# Patient Record
Sex: MALE | Race: WHITE | Employment: FULL TIME | URBAN - METROPOLITAN AREA
[De-identification: names, ages, dates, MRNs, and addresses within clinical notes are randomized per-mention and may not be internally consistent; named-entity substitution may affect disease eponyms.]

---

## 2019-12-03 ENCOUNTER — OFFICE VISIT (OUTPATIENT)
Dept: FAMILY MEDICINE | Facility: CLINIC | Age: 59
End: 2019-12-03
Payer: COMMERCIAL

## 2019-12-03 VITALS
OXYGEN SATURATION: 98 % | TEMPERATURE: 98.3 F | DIASTOLIC BLOOD PRESSURE: 88 MMHG | HEIGHT: 71 IN | RESPIRATION RATE: 16 BRPM | WEIGHT: 211 LBS | SYSTOLIC BLOOD PRESSURE: 128 MMHG | BODY MASS INDEX: 29.54 KG/M2 | HEART RATE: 79 BPM

## 2019-12-03 DIAGNOSIS — Z80.0 FAMILY HX OF COLON CANCER: ICD-10-CM

## 2019-12-03 DIAGNOSIS — Z82.49 FAMILY HISTORY OF PREMATURE CORONARY ARTERY DISEASE: ICD-10-CM

## 2019-12-03 DIAGNOSIS — Z00.00 PHYSICAL EXAM, ANNUAL: Primary | ICD-10-CM

## 2019-12-03 DIAGNOSIS — Z12.11 COLON CANCER SCREENING: ICD-10-CM

## 2019-12-03 DIAGNOSIS — E78.00 PURE HYPERCHOLESTEROLEMIA: ICD-10-CM

## 2019-12-03 PROBLEM — R03.0 ELEVATED BLOOD PRESSURE READING WITHOUT DIAGNOSIS OF HYPERTENSION: Status: ACTIVE | Noted: 2019-12-03

## 2019-12-03 PROCEDURE — 200200 PR NO CHARGE: Performed by: FAMILY MEDICINE

## 2019-12-03 PROCEDURE — 99386 PREV VISIT NEW AGE 40-64: CPT | Performed by: FAMILY MEDICINE

## 2019-12-03 ASSESSMENT — PAIN SCALES - GENERAL: PAINLEVEL: 0-NO PAIN

## 2019-12-10 ASSESSMENT — ENCOUNTER SYMPTOMS
ENDOCRINE NEGATIVE: 1
CONSTITUTIONAL NEGATIVE: 1
HEMATOLOGIC/LYMPHATIC NEGATIVE: 1
PSYCHIATRIC NEGATIVE: 1
EYES NEGATIVE: 1
GASTROINTESTINAL NEGATIVE: 1
CARDIOVASCULAR NEGATIVE: 1
BACK PAIN: 1
NEUROLOGICAL NEGATIVE: 1
RESPIRATORY NEGATIVE: 1

## 2019-12-10 NOTE — PROGRESS NOTES
Subjective      Patient ID: Jaskaran Simpson is a 59 y.o. male.    Rolo is being seen by me for the first time in over 20 years and he is pleased to report that his health generally has been good.  His past medical history is significant for GI issues and that he has a family history of colon cancer (father) and he himself has had 6 colonoscopies with multiple polyps.  Unfortunately his last colonoscopy was .  He does have concerns today about possible hernia.  He continues to see a chiropractor regularly for mid back pain and gets good relief from those treatments.  His blood pressure is always been high at doctor's appointments but his home blood pressure monitor is always normal.  Both of his parents  from acute myocardial infarction's in their early 70s.      The following have been reviewed and updated as appropriate in this visit:       Review of Systems   Constitutional: Negative.    HENT: Negative.    Eyes: Negative.    Respiratory: Negative.    Cardiovascular: Negative.    Gastrointestinal: Negative.    Endocrine: Negative.    Genitourinary: Negative.    Musculoskeletal: Positive for back pain.   Skin: Negative.    Allergic/Immunologic: Negative for food allergies.   Neurological: Negative.    Hematological: Negative.    Psychiatric/Behavioral: Negative.      Past Medical History:   Diagnosis Date   • Hypertension      Family History   Problem Relation Age of Onset   • Hypertension Biological Mother    • Hyperlipidemia Biological Mother    • Colon cancer Biological Father      Social History     Socioeconomic History   • Marital status:      Spouse name: Not on file   • Number of children: Not on file   • Years of education: Not on file   • Highest education level: Not on file   Occupational History   • Not on file   Social Needs   • Financial resource strain: Not on file   • Food insecurity:     Worry: Not on file     Inability: Not on file   • Transportation needs:     Medical: Not on file  "    Non-medical: Not on file   Tobacco Use   • Smoking status: Current Every Day Smoker   • Smokeless tobacco: Never Used   Substance and Sexual Activity   • Alcohol use: Yes   • Drug use: Not Currently   • Sexual activity: Defer   Lifestyle   • Physical activity:     Days per week: Not on file     Minutes per session: Not on file   • Stress: Not on file   Relationships   • Social connections:     Talks on phone: Not on file     Gets together: Not on file     Attends [employer]ism service: Not on file     Active member of club or organization: Not on file     Attends meetings of clubs or organizations: Not on file     Relationship status: Not on file   • Intimate partner violence:     Fear of current or ex partner: Not on file     Emotionally abused: Not on file     Physically abused: Not on file     Forced sexual activity: Not on file   Other Topics Concern   • Not on file   Social History Narrative   • Not on file     No Known Allergies        No current outpatient medications on file.     No current facility-administered medications for this visit.      Objective     Visit Vitals  /88 (BP Location: Right upper arm, Patient Position: Sitting)   Pulse 79   Temp 36.8 °C (98.3 °F) (Oral)   Resp 16   Ht 1.803 m (5' 11\")   Wt 95.7 kg (211 lb)   SpO2 98%   BMI 29.43 kg/m²       Physical Exam   Constitutional: He is oriented to person, place, and time. He appears well-developed and well-nourished.   HENT:   Head: Normocephalic.   Eyes: Conjunctivae are normal.   Neck: Neck supple. No thyromegaly present.   Cardiovascular: Normal rate, regular rhythm, normal heart sounds and intact distal pulses.   No murmur heard.  Pulmonary/Chest: Effort normal and breath sounds normal.   Abdominal: Soft. Bowel sounds are normal. He exhibits no mass. A hernia is present. Hernia confirmed positive in the left inguinal area.   Musculoskeletal: He exhibits no edema or deformity.   Lymphadenopathy:     He has no cervical adenopathy. "   Neurological: He is alert and oriented to person, place, and time.   Skin: Skin is warm and dry. No rash noted.   Psychiatric: He has a normal mood and affect. His behavior is normal. Judgment and thought content normal.       Assessment/Plan   Problem List Items Addressed This Visit        Endocrine/Metabolic    Hyperlipidemia    Relevant Orders    CT HEART CORONARY CALCIUM SCORE    ECG 12 LEAD OFFICE PERFORMED       Other    Family hx of colon cancer     Lab pending.           Other Visit Diagnoses     Physical exam, annual    -  Primary    Recheck annually.  Full lab pending.    Relevant Orders    ECG 12 LEAD OFFICE PERFORMED    CBC    Comprehensive metabolic panel    Hepatitis C antibody    Lipid panel    Urinalysis with microscopic    PSA    Colon cancer screening        Referred back to [facility]    Relevant Orders    Direct Access Colonoscopy [facility]    Family history of premature coronary artery disease        Lab pending.  Recommend CT coronary calcium score.  Patient agrees to schedule.    Relevant Orders    CT HEART CORONARY CALCIUM SCORE    ECG 12 LEAD OFFICE PERFORMED        Héctor Dyer,   12/10/2019

## 2019-12-11 LAB
ALBUMIN SERPL-MCNC: 4.6 G/DL (ref 3.6–5.1)
ALBUMIN/GLOB SERPL: 1.9 (CALC) (ref 1–2.5)
ALP SERPL-CCNC: 49 U/L (ref 40–115)
ALT SERPL-CCNC: 61 U/L (ref 9–46)
APPEARANCE UR: CLEAR
AST SERPL-CCNC: 29 U/L (ref 10–35)
BACTERIA #/AREA URNS HPF: ABNORMAL /HPF
BILIRUB SERPL-MCNC: 0.9 MG/DL (ref 0.2–1.2)
BILIRUB UR QL STRIP: NEGATIVE
BUN SERPL-MCNC: 15 MG/DL (ref 7–25)
BUN/CREAT SERPL: ABNORMAL (CALC) (ref 6–22)
CALCIUM SERPL-MCNC: 9.3 MG/DL (ref 8.6–10.3)
CHLORIDE SERPL-SCNC: 101 MMOL/L (ref 98–110)
CHOLEST SERPL-MCNC: 230 MG/DL
CHOLEST/HDLC SERPL: 4.4 (CALC)
CO2 SERPL-SCNC: 26 MMOL/L (ref 20–32)
COLOR UR: YELLOW
CREAT SERPL-MCNC: 1.13 MG/DL (ref 0.7–1.33)
ERYTHROCYTE [DISTWIDTH] IN BLOOD BY AUTOMATED COUNT: 12 % (ref 11–15)
GLOBULIN SER CALC-MCNC: 2.4 G/DL (CALC) (ref 1.9–3.7)
GLUCOSE SERPL-MCNC: 97 MG/DL (ref 65–99)
GLUCOSE UR QL STRIP: NEGATIVE
HCT VFR BLD AUTO: 43.1 % (ref 38.5–50)
HCV AB S/CO SERPL IA: 0.02
HCV AB SERPL QL IA: NORMAL
HDLC SERPL-MCNC: 52 MG/DL
HGB BLD-MCNC: 14.8 G/DL (ref 13.2–17.1)
HGB UR QL STRIP: NEGATIVE
HYALINE CASTS #/AREA URNS LPF: ABNORMAL /LPF
KETONES UR QL STRIP: ABNORMAL
LDLC SERPL CALC-MCNC: 163 MG/DL (CALC)
LEUKOCYTE ESTERASE UR QL STRIP: NEGATIVE
MCH RBC QN AUTO: 33.3 PG (ref 27–33)
MCHC RBC AUTO-ENTMCNC: 34.3 G/DL (ref 32–36)
MCV RBC AUTO: 97.1 FL (ref 80–100)
NITRITE UR QL STRIP: NEGATIVE
NONHDLC SERPL-MCNC: 178 MG/DL (CALC)
PH UR STRIP: 6 [PH] (ref 5–8)
PLATELET # BLD AUTO: 206 THOUSAND/UL (ref 140–400)
PMV BLD REES-ECKER: 9.8 FL (ref 7.5–12.5)
POTASSIUM SERPL-SCNC: 4.3 MMOL/L (ref 3.5–5.3)
PROT SERPL-MCNC: 7 G/DL (ref 6.1–8.1)
PROT UR QL STRIP: NEGATIVE
PSA SERPL-MCNC: 1.3 NG/ML
QUEST EGFR NON-AFR. AMERICAN: 71 ML/MIN/1.73M2
RBC # BLD AUTO: 4.44 MILLION/UL (ref 4.2–5.8)
RBC #/AREA URNS HPF: ABNORMAL /HPF
SODIUM SERPL-SCNC: 136 MMOL/L (ref 135–146)
SP GR UR STRIP: 1.02 (ref 1–1.03)
SQUAMOUS #/AREA URNS HPF: ABNORMAL /HPF
TRIGL SERPL-MCNC: 59 MG/DL
WBC # BLD AUTO: 6.2 THOUSAND/UL (ref 3.8–10.8)
WBC #/AREA URNS HPF: ABNORMAL /HPF

## 2020-01-08 ENCOUNTER — TELEPHONE (OUTPATIENT)
Dept: FAMILY MEDICINE | Facility: CLINIC | Age: 60
End: 2020-01-08

## 2020-01-30 ENCOUNTER — TELEPHONE (OUTPATIENT)
Dept: FAMILY MEDICINE | Facility: CLINIC | Age: 60
End: 2020-01-30

## 2020-01-30 NOTE — TELEPHONE ENCOUNTER
Phone call from pt about bill for $30. Charge was for a EKG but pt didn't have a EKG don. I advise pt that the EKG was retracted and he does not owe anything. I also gave pt my direct phone number incase he has any other problems.

## 2020-02-25 ENCOUNTER — OFFICE VISIT (OUTPATIENT)
Dept: FAMILY MEDICINE | Facility: CLINIC | Age: 60
End: 2020-02-25
Payer: COMMERCIAL

## 2020-02-25 VITALS
BODY MASS INDEX: 28.56 KG/M2 | OXYGEN SATURATION: 98 % | WEIGHT: 204 LBS | DIASTOLIC BLOOD PRESSURE: 82 MMHG | HEART RATE: 84 BPM | SYSTOLIC BLOOD PRESSURE: 124 MMHG | HEIGHT: 71 IN | TEMPERATURE: 97.9 F | RESPIRATION RATE: 16 BRPM

## 2020-02-25 DIAGNOSIS — E78.00 PURE HYPERCHOLESTEROLEMIA: Primary | ICD-10-CM

## 2020-02-25 PROCEDURE — 99214 OFFICE O/P EST MOD 30 MIN: CPT | Performed by: FAMILY MEDICINE

## 2020-02-25 ASSESSMENT — ENCOUNTER SYMPTOMS
LIGHT-HEADEDNESS: 0
DIZZINESS: 0
PSYCHIATRIC NEGATIVE: 1
HEADACHES: 0
DIARRHEA: 0
COUGH: 0
SORE THROAT: 0
CONSTITUTIONAL NEGATIVE: 1
SINUS PRESSURE: 0
CONSTIPATION: 0
PALPITATIONS: 0
SHORTNESS OF BREATH: 0

## 2020-02-25 ASSESSMENT — PAIN SCALES - GENERAL: PAINLEVEL: 0-NO PAIN

## 2020-02-25 NOTE — ASSESSMENT & PLAN NOTE
The purpose and nature of cholesterol was explained as well as a lengthy review of dietary sources.  We also again discussed the benefits of CT coronary calcium score in an effort to assess his risk.  He has agreed to pursue this and final decision regarding medication will be pending those results.  Total visit time was 30 minutes with greater than 50% spent discussing the current condition and treatment options/plan.

## 2020-02-25 NOTE — PROGRESS NOTES
Subjective      Patient ID: Jsakaran Simpson is a 59 y.o. male.    He is here today to discuss the results of his recent lab which demonstrated a significantly elevated cholesterol.  He admits that he eats a significant amount of animal-based foods and cheese.      The following have been reviewed and updated as appropriate in this visit:       Review of Systems   Constitutional: Negative.    HENT: Negative for congestion, sinus pressure and sore throat.    Respiratory: Negative for cough and shortness of breath.    Cardiovascular: Negative for chest pain and palpitations.   Gastrointestinal: Negative for constipation and diarrhea.   Genitourinary: Negative.    Skin: Negative for rash.   Neurological: Negative for dizziness, light-headedness and headaches.   Psychiatric/Behavioral: Negative.      Past Medical History:   Diagnosis Date   • Hypertension      Family History   Problem Relation Age of Onset   • Hypertension Biological Mother    • Hyperlipidemia Biological Mother    • Colon cancer Biological Father      Social History     Socioeconomic History   • Marital status:      Spouse name: Not on file   • Number of children: Not on file   • Years of education: Not on file   • Highest education level: Not on file   Occupational History   • Not on file   Social Needs   • Financial resource strain: Not on file   • Food insecurity:     Worry: Not on file     Inability: Not on file   • Transportation needs:     Medical: Not on file     Non-medical: Not on file   Tobacco Use   • Smoking status: Current Every Day Smoker   • Smokeless tobacco: Never Used   Substance and Sexual Activity   • Alcohol use: Yes   • Drug use: Not Currently   • Sexual activity: Defer   Lifestyle   • Physical activity:     Days per week: Not on file     Minutes per session: Not on file   • Stress: Not on file   Relationships   • Social connections:     Talks on phone: Not on file     Gets together: Not on file     Attends Christian service:  "Not on file     Active member of club or organization: Not on file     Attends meetings of clubs or organizations: Not on file     Relationship status: Not on file   • Intimate partner violence:     Fear of current or ex partner: Not on file     Emotionally abused: Not on file     Physically abused: Not on file     Forced sexual activity: Not on file   Other Topics Concern   • Not on file   Social History Narrative   • Not on file     No Known Allergies        No current outpatient medications on file.     No current facility-administered medications for this visit.      Objective     Visit Vitals  /82 (BP Location: Right upper arm, Patient Position: Sitting)   Pulse 84   Temp 36.6 °C (97.9 °F) (Temporal)   Resp 16   Ht 1.803 m (5' 11\")   Wt 92.5 kg (204 lb)   SpO2 98%   BMI 28.45 kg/m²       Physical Exam   Constitutional: He is oriented to person, place, and time. He appears well-developed and well-nourished.   HENT:   Head: Normocephalic.   Eyes: Conjunctivae are normal.   Cardiovascular: Normal rate and regular rhythm.   Pulmonary/Chest: Effort normal.   Neurological: He is alert and oriented to person, place, and time.   Skin: Skin is warm and dry.   Psychiatric: He has a normal mood and affect. His behavior is normal. Judgment and thought content normal.       Assessment/Plan   Problem List Items Addressed This Visit        Endocrine/Metabolic    Pure hypercholesterolemia - Primary     The purpose and nature of cholesterol was explained as well as a lengthy review of dietary sources.  We also again discussed the benefits of CT coronary calcium score in an effort to assess his risk.  He has agreed to pursue this and final decision regarding medication will be pending those results.  Total visit time was 30 minutes with greater than 50% spent discussing the current condition and treatment options/plan.             Héctor Dyer, DO  2/25/2020  "

## 2020-02-25 NOTE — PATIENT INSTRUCTIONS
Patient Education     Steps to Quit Smoking    Smoking tobacco can be harmful to your health and can affect almost every organ in your body. Smoking puts you, and those around you, at risk for developing many serious chronic diseases. Quitting smoking is difficult, but it is one of the best things that you can do for your health. It is never too late to quit.  What are the benefits of quitting smoking?  When you quit smoking, you lower your risk of developing serious diseases and conditions, such as:  · Lung cancer or lung disease, such as COPD.  · Heart disease.  · Stroke.  · Heart attack.  · Infertility.  · Osteoporosis and bone fractures.  Additionally, symptoms such as coughing, wheezing, and shortness of breath may get better when you quit. You may also find that you get sick less often because your body is stronger at fighting off colds and infections. If you are pregnant, quitting smoking can help to reduce your chances of having a baby of low birth weight.  How do I get ready to quit?  When you decide to quit smoking, create a plan to make sure that you are successful. Before you quit:  · Pick a date to quit. Set a date within the next two weeks to give you time to prepare.  · Write down the reasons why you are quitting. Keep this list in places where you will see it often, such as on your bathroom mirror or in your car or wallet.  · Identify the people, places, things, and activities that make you want to smoke (triggers) and avoid them. Make sure to take these actions:  ? Throw away all cigarettes at home, at work, and in your car.  ? Throw away smoking accessories, such as ashtrays and lighters.  ? Clean your car and make sure to empty the ashtray.  ? Clean your home, including curtains and carpets.  · Tell your family, friends, and coworkers that you are quitting. Support from your loved ones can make quitting easier.  · Talk with your health care provider about your options for quitting  smoking.  · Find out what treatment options are covered by your health insurance.  What strategies can I use to quit smoking?  Talk with your healthcare provider about different strategies to quit smoking. Some strategies include:  · Quitting smoking altogether instead of gradually lessening how much you smoke over a period of time. Research shows that quitting “cold turkey” is more successful than gradually quitting.  · Attending in-person counseling to help you build problem-solving skills. You are more likely to have success in quitting if you attend several counseling sessions. Even short sessions of 10 minutes can be effective.  · Finding resources and support systems that can help you to quit smoking and remain smoke-free after you quit. These resources are most helpful when you use them often. They can include:  ? Online chats with a counselor.  ? Telephone quitlines.  ? Printed self-help materials.  ? Support groups or group counseling.  ? Text messaging programs.  ? Mobile phone applications.  · Taking medicines to help you quit smoking. (If you are pregnant or breastfeeding, talk with your health care provider first.) Some medicines contain nicotine and some do not. Both types of medicines help with cravings, but the medicines that include nicotine help to relieve withdrawal symptoms. Your health care provider may recommend:  ? Nicotine patches, gum, or lozenges.  ? Nicotine inhalers or sprays.  ? Non-nicotine medicine that is taken by mouth.  Talk with your health care provider about combining strategies, such as taking medicines while you are also receiving in-person counseling. Using these two strategies together makes you more likely to succeed in quitting than if you used either strategy on its own.  If you are pregnant or breastfeeding, talk with your health care provider about finding counseling or other support strategies to quit smoking. Do not take medicine to help you quit smoking unless told to  do so by your health care provider.  What things can I do to make it easier to quit?  Quitting smoking might feel overwhelming at first, but there is a lot that you can do to make it easier. Take these important actions:  · Reach out to your family and friends and ask that they support and encourage you during this time. Call telephone quitlines, reach out to support groups, or work with a counselor for support.  · Ask people who smoke to avoid smoking around you.  · Avoid places that trigger you to smoke, such as bars, parties, or smoke-break areas at work.  · Spend time around people who do not smoke.  · Lessen stress in your life, because stress can be a smoking trigger for some people. To lessen stress, try:  ? Exercising regularly.  ? Deep-breathing exercises.  ? Yoga.  ? Meditating.  ? Performing a body scan. This involves closing your eyes, scanning your body from head to toe, and noticing which parts of your body are particularly tense. Purposefully relax the muscles in those areas.  · Download or purchase mobile phone or tablet apps (applications) that can help you stick to your quit plan by providing reminders, tips, and encouragement. There are many free apps, such as QuitGuide from the CDC (Centers for Disease Control and Prevention). You can find other support for quitting smoking (smoking cessation) through smokefree.gov and other websites.  How will I feel when I quit smoking?  Within the first 24 hours of quitting smoking, you may start to feel some withdrawal symptoms. These symptoms are usually most noticeable 2-3 days after quitting, but they usually do not last beyond 2-3 weeks. Changes or symptoms that you might experience include:  · Mood swings.  · Restlessness, anxiety, or irritation.  · Difficulty concentrating.  · Dizziness.  · Strong cravings for sugary foods in addition to nicotine.  · Mild weight gain.  · Constipation.  · Nausea.  · Coughing or a sore throat.  · Changes in how your  medicines work in your body.  · A depressed mood.  · Difficulty sleeping (insomnia).  After the first 2-3 weeks of quitting, you may start to notice more positive results, such as:  · Improved sense of smell and taste.  · Decreased coughing and sore throat.  · Slower heart rate.  · Lower blood pressure.  · Clearer skin.  · The ability to breathe more easily.  · Fewer sick days.  Quitting smoking is very challenging for most people. Do not get discouraged if you are not successful the first time. Some people need to make many attempts to quit before they achieve long-term success. Do your best to stick to your quit plan, and talk with your health care provider if you have any questions or concerns.  This information is not intended to replace advice given to you by your health care provider. Make sure you discuss any questions you have with your health care provider.  Document Released: 12/12/2002 Document Revised: 07/24/2018 Document Reviewed: 05/03/2016  Elsevier Interactive Patient Education © 2019 [employer]

## 2020-10-12 ENCOUNTER — TELEMEDICINE (OUTPATIENT)
Dept: FAMILY MEDICINE | Facility: CLINIC | Age: 60
End: 2020-10-12
Payer: COMMERCIAL

## 2020-10-12 DIAGNOSIS — R50.9 FEVER AND CHILLS: ICD-10-CM

## 2020-10-12 DIAGNOSIS — U07.1 COVID-19: Primary | ICD-10-CM

## 2020-10-12 PROCEDURE — 99214 OFFICE O/P EST MOD 30 MIN: CPT | Mod: 95,CS | Performed by: NURSE PRACTITIONER

## 2020-10-12 ASSESSMENT — ENCOUNTER SYMPTOMS
BLOOD IN STOOL: 0
NECK PAIN: 0
ACTIVITY CHANGE: 0
DIARRHEA: 0
PALPITATIONS: 0
DIAPHORESIS: 1
NECK STIFFNESS: 0
FREQUENCY: 0
SINUS PRESSURE: 0
DIFFICULTY URINATING: 0
CHILLS: 1
SORE THROAT: 0
NAUSEA: 0
FEVER: 1
SHORTNESS OF BREATH: 0
TROUBLE SWALLOWING: 0
RHINORRHEA: 1
COUGH: 1
SINUS PAIN: 0
JOINT SWELLING: 0
FLANK PAIN: 0
BACK PAIN: 0
ABDOMINAL DISTENTION: 0
FATIGUE: 1
ANAL BLEEDING: 0
CHEST TIGHTNESS: 0
DYSURIA: 0
CONSTIPATION: 0
VOMITING: 0
ABDOMINAL PAIN: 0
HEMATURIA: 0

## 2020-10-12 NOTE — PROGRESS NOTES
Verification of Patient Location:  The patient affirms they are currently located in the following state: [place]    Request for Consent:    Video Encounter   Jing, my name is LISA Romeo.  Before we proceed, can you please verify your identification by telling me your full name and date of birth?  Can you tell me who is in the room with you?    You and I are about to have a telemedicine check-in or visit because you have requested it.  This is a live video-conference.  I am a real person, speaking to you in real time.  There is no one else with me on the video-conference.  However, when we use (Brilig, ulike, etc) it is important for you to know that the video-conference may not be secure or private.  I am not recording this conversation and I am asking you not to record it.  This telemedicine visit will be billed to your health insurance or you, if you are self-insured.  You understand you will be responsible for any copayments or coinsurances that apply to your telemedicine visit.  Communication platform used for this encounter:  DoximFridge     Before starting our telemedicine visit, I am required to get your consent for this virtual check-in or visit by telemedicine. Do you consent?      Patient Response to Request for Consent:  Yes      Visit Documentation:  Subjective     Patient ID: Jaskaran Simpson is a 60 y.o. male.  1960      HPI  Seen at urgent care and reports covid + on October 5th to [facility] [place].      Reports symptoms started October 4th and tested on 6th   Still running a temp since last Sunday now day 8 - alt  taking Tylenol 1 gram twice per day and Ibuprofen 400 mg twice per day   Yesterday 102 spike shonna at night sweats then returns to normal temp  Usually 100, spikes are usually around evening/bedtime    Denies feeling short of breath  Took zpak as well on the 5th but did not help  He can hold his breath for 20 seconds and able to do that     + post nasal drip, mild  cough v rare mostly in am  Diarrhea initially but resolved about 4 days ago  Stomach ok no pain  Good hydration urinating well     Overall feeling better than initial    Taking 15 min naps       The following have been reviewed and updated as appropriate in this visit:       Review of Systems   Constitutional: Positive for chills, diaphoresis, fatigue and fever. Negative for activity change.   HENT: Positive for congestion, postnasal drip and rhinorrhea. Negative for sinus pressure, sinus pain, sore throat and trouble swallowing.         Sore throat has since resolved   + post nasal drip   Loss of taste and smell but coming back a bit    Respiratory: Positive for cough (mild ). Negative for chest tightness and shortness of breath.    Cardiovascular: Negative for chest pain, palpitations and leg swelling.   Gastrointestinal: Negative for abdominal distention, abdominal pain, anal bleeding, blood in stool, constipation, diarrhea, nausea and vomiting.   Genitourinary: Negative for decreased urine volume, difficulty urinating, dysuria, flank pain, frequency, hematuria, penile pain, testicular pain and urgency.   Musculoskeletal: Negative for back pain, joint swelling, neck pain and neck stiffness.        Myalgias all resolved -initially fair amount   Skin: Negative for rash.         Assessment/Plan   Diagnoses and all orders for this visit:    COVID-19 (Primary)    Fever and chills    Reassured he is feeling better.  Fever x 8 days now but overall feeling so much better.  He is overall feeling better; If persistent fever would recommend CXR by Wednesday at urgent care.  If he is doing ok would not need to take medication reviewed guidelines for tylenol and ibuprofen dosing.  Keep up hydration    Answered mult questions and concerns     Instructed to call for any worsening of the current symptoms or concerns at all. Patient was instructed for any pending results to call if they does not hear from me in 7 business days or  1 day for urgent/stat results.  Patient was also instructed not to send an urgent issues via my chart and and to call.  There can be a significant delay in my chart and direct call is the safest method to ensure appropriate and timely care.    All concerns addressed.  Patient v understanding and agreement with plan of care.         Time Spent in Medical Discussion During This Encounter:     Total encounter time, with >50 percent spent counseling/coordinatin minutes

## 2020-11-10 ENCOUNTER — APPOINTMENT (RX ONLY)
Dept: URBAN - METROPOLITAN AREA CLINIC 374 | Facility: CLINIC | Age: 60
Setting detail: DERMATOLOGY
End: 2020-11-10

## 2020-11-10 DIAGNOSIS — L82.1 OTHER SEBORRHEIC KERATOSIS: ICD-10-CM

## 2020-11-10 DIAGNOSIS — L72.8 OTHER FOLLICULAR CYSTS OF THE SKIN AND SUBCUTANEOUS TISSUE: ICD-10-CM

## 2020-11-10 PROCEDURE — ? COUNSELING

## 2020-11-10 PROCEDURE — 99202 OFFICE O/P NEW SF 15 MIN: CPT | Mod: 25

## 2020-11-10 PROCEDURE — ? PRESCRIPTION MEDICATION MANAGEMENT

## 2020-11-10 PROCEDURE — 11900 INJECT SKIN LESIONS </W 7: CPT

## 2020-11-10 PROCEDURE — ? INTRALESIONAL KENALOG

## 2020-11-10 PROCEDURE — ? DEFER

## 2020-11-10 ASSESSMENT — LOCATION ZONE DERM
LOCATION ZONE: FACE
LOCATION ZONE: TRUNK

## 2020-11-10 ASSESSMENT — LOCATION SIMPLE DESCRIPTION DERM
LOCATION SIMPLE: RIGHT FOREHEAD
LOCATION SIMPLE: CHEST

## 2020-11-10 ASSESSMENT — LOCATION DETAILED DESCRIPTION DERM
LOCATION DETAILED: RIGHT INFERIOR LATERAL FOREHEAD
LOCATION DETAILED: STERNUM
LOCATION DETAILED: LEFT MEDIAL SUPERIOR CHEST
LOCATION DETAILED: RIGHT FOREHEAD

## 2020-11-10 NOTE — PROCEDURE: INTRALESIONAL KENALOG
Size Of Lesion (Optional): 4.5
X Size Of Lesion In Cm (Optional): 3.5
Include Z78.9 (Other Specified Conditions Influencing Health Status) As An Associated Diagnosis?: No
Kenalog Preparation: Kenalog
Administered By (Optional): cassandra
Consent: The risks of atrophy were reviewed with the patient.
Detail Level: Detailed
Concentration Of Solution Injected (Mg/Ml): 5.0
Treatment Number (Optional): 1
Medical Necessity Clause: This procedure was medically necessary because the lesions that were treated were:

## 2020-11-10 NOTE — PROCEDURE: PRESCRIPTION MEDICATION MANAGEMENT
Detail Level: Zone
Render In Strict Bullet Format?: No
Continue Regimen: Clindamycin 300mg TID x 10 days (prescribed by Estorian)

## 2020-11-10 NOTE — HPI: CYST
How Severe Is Your Cyst?: moderate
Is This A New Presentation, Or A Follow-Up?: Cyst
Additional History: Patient went to urgent care and was given clindamycin 300mg tablets TID X 10 days

## 2020-12-01 ENCOUNTER — APPOINTMENT (RX ONLY)
Dept: URBAN - METROPOLITAN AREA CLINIC 374 | Facility: CLINIC | Age: 60
Setting detail: DERMATOLOGY
End: 2020-12-01

## 2020-12-01 DIAGNOSIS — L72.8 OTHER FOLLICULAR CYSTS OF THE SKIN AND SUBCUTANEOUS TISSUE: ICD-10-CM

## 2020-12-01 PROCEDURE — ? MEDICATION COUNSELING

## 2020-12-01 PROCEDURE — 10060 I&D ABSCESS SIMPLE/SINGLE: CPT

## 2020-12-01 PROCEDURE — ? PRESCRIPTION

## 2020-12-01 PROCEDURE — ? ORDER TESTS

## 2020-12-01 PROCEDURE — ? DEFER

## 2020-12-01 PROCEDURE — ? INCISION AND DRAINAGE

## 2020-12-01 PROCEDURE — ? PRESCRIPTION MEDICATION MANAGEMENT

## 2020-12-01 RX ORDER — DOXYCYCLINE 100 MG/1
CAPSULE ORAL BID
Qty: 28 | Refills: 3 | Status: ERX | COMMUNITY
Start: 2020-12-01

## 2020-12-01 RX ADMIN — DOXYCYCLINE: 100 CAPSULE ORAL at 00:00

## 2020-12-01 ASSESSMENT — LOCATION DETAILED DESCRIPTION DERM
LOCATION DETAILED: LEFT MEDIAL SUPERIOR CHEST
LOCATION DETAILED: STERNUM

## 2020-12-01 ASSESSMENT — LOCATION SIMPLE DESCRIPTION DERM: LOCATION SIMPLE: CHEST

## 2020-12-01 ASSESSMENT — LOCATION ZONE DERM: LOCATION ZONE: TRUNK

## 2020-12-01 NOTE — PROCEDURE: INCISION AND DRAINAGE
Detail Level: Detailed
Lesion Type: Cyst
Method: 15 blade
Curette: No
Anesthesia Type: 1% lidocaine with epinephrine
Size Of Lesion In Cm (Optional But May Be Required For Some Insurances): 0
Drainage Amount?: moderate
Drainage Type?: bloody and cyst-like
Wound Care: Petrolatum
Dressing: dry sterile dressing
Epidermal Sutures: 4-0 Ethilon
Epidermal Closure: simple interrupted
Suture Text: The incision was partially closed with
Preparation Text: The area was prepped in the usual clean fashion.
Curette Text (Optional): After the contents were expressed a curette was used to partially remove the cyst wall.
Consent was obtained and risks were reviewed including but not limited to delayed wound healing, infection, need for multiple I and D's, and pain.
Post-Care Instructions: I reviewed with the patient in detail post-care instructions. Patient should keep wound covered and call the office should any redness, pain, swelling or worsening occur.

## 2020-12-01 NOTE — PROCEDURE: MEDICATION COUNSELING
Wound Care: Vaseline Billing Type: Third-Party Bill Cryotherapy Text: The wound bed was treated with cryotherapy after the biopsy was performed. Notification Instructions: Patient will be notified of biopsy results. However, patient instructed to call the office if not contacted within 2 weeks. Curettage Text: The wound bed was treated with curettage after the biopsy was performed. Anesthesia Type: 1% lidocaine with epinephrine Type Of Destruction Used: Curettage Was A Bandage Applied: Yes Render Post-Care Instructions In Note?: no Additional Anesthesia Volume In Cc (Will Not Render If 0): 0 Hemostasis: Justin's Silver Nitrate Text: The wound bed was treated with silver nitrate after the biopsy was performed. Depth Of Biopsy: dermis Post-Care Instructions: I reviewed with the patient in detail post-care instructions. Patient is to keep the biopsy site dry overnight, and then apply bacitracin twice daily until healed. Patient may apply hydrogen peroxide soaks to remove any crusting. Anesthesia Volume In Cc: 1 Electrodesiccation Text: The wound bed was treated with electrodesiccation after the biopsy was performed. Detail Level: Detailed Dressing: bandage Biopsy Type: H and E Electrodesiccation And Curettage Text: The wound bed was treated with electrodesiccation and curettage after the biopsy was performed. Biopsy Method: Personna blade Consent: Written consent was obtained and risks were reviewed including but not limited to scarring, infection, bleeding, scabbing, incomplete removal, nerve damage and allergy to anesthesia. Minocycline Counseling: Patient advised regarding possible photosensitivity and discoloration of the teeth, skin, lips, tongue and gums.  Patient instructed to avoid sunlight, if possible.  When exposed to sunlight, patients should wear protective clothing, sunglasses, and sunscreen.  The patient was instructed to call the office immediately if the following severe adverse effects occur:  hearing changes, easy bruising/bleeding, severe headache, or vision changes.  The patient verbalized understanding of the proper use and possible adverse effects of minocycline.  All of the patient's questions and concerns were addressed.

## 2020-12-01 NOTE — PROCEDURE: MEDICATION COUNSELING
Monitor. Erivedge Counseling- I discussed with the patient the risks of Erivedge including but not limited to nausea, vomiting, diarrhea, constipation, weight loss, changes in the sense of taste, decreased appetite, muscle spasms, and hair loss.  The patient verbalized understanding of the proper use and possible adverse effects of Erivedge.  All of the patient's questions and concerns were addressed.

## 2020-12-01 NOTE — PROCEDURE: PRESCRIPTION MEDICATION MANAGEMENT
Detail Level: Zone
Render In Strict Bullet Format?: No
Initiate Treatment: Doxycycline 100mg x 2 weeks BID
Discontinue Regimen: Clindamycin 300mg TID x 10 days (prescribed by [facility])

## 2020-12-01 NOTE — PROCEDURE: ORDER TESTS
Expected Date Of Service: 12/01/2020
Billing Type: Third-Party Bill
Bill For Surgical Tray: no
Performing Laboratory: [id number]

## 2020-12-01 NOTE — PROCEDURE: MEDICATION COUNSELING
right foot surgery Griseofulvin Pregnancy And Lactation Text: This medication is Pregnancy Category X and is known to cause serious birth defects. It is unknown if this medication is excreted in breast milk but breast feeding should be avoided.

## 2020-12-15 ENCOUNTER — APPOINTMENT (RX ONLY)
Dept: URBAN - METROPOLITAN AREA CLINIC 374 | Facility: CLINIC | Age: 60
Setting detail: DERMATOLOGY
End: 2020-12-15

## 2020-12-15 DIAGNOSIS — L72.8 OTHER FOLLICULAR CYSTS OF THE SKIN AND SUBCUTANEOUS TISSUE: ICD-10-CM

## 2020-12-15 PROCEDURE — ? EXCISION

## 2020-12-15 PROCEDURE — ? COUNSELING

## 2020-12-15 PROCEDURE — ? PHOTO-DOCUMENTATION

## 2020-12-15 PROCEDURE — 11403 EXC TR-EXT B9+MARG 2.1-3CM: CPT

## 2020-12-15 PROCEDURE — 12032 INTMD RPR S/A/T/EXT 2.6-7.5: CPT

## 2020-12-15 ASSESSMENT — LOCATION ZONE DERM: LOCATION ZONE: TRUNK

## 2020-12-15 ASSESSMENT — LOCATION DETAILED DESCRIPTION DERM: LOCATION DETAILED: STERNUM

## 2020-12-15 ASSESSMENT — LOCATION SIMPLE DESCRIPTION DERM: LOCATION SIMPLE: CHEST

## 2020-12-15 NOTE — PROCEDURE: EXCISION
Perilesional Excision Additional Text (Leave Blank If You Do Not Want): The margin was drawn around the clinically apparent lesion. Incisions were then made along these lines to the appropriate tissue plane and the lesion was extirpated. no

## 2020-12-29 ENCOUNTER — APPOINTMENT (RX ONLY)
Dept: URBAN - METROPOLITAN AREA CLINIC 374 | Facility: CLINIC | Age: 60
Setting detail: DERMATOLOGY
End: 2020-12-29

## 2020-12-29 DIAGNOSIS — Z48.817 ENCOUNTER FOR SURGICAL AFTERCARE FOLLOWING SURGERY ON THE SKIN AND SUBCUTANEOUS TISSUE: ICD-10-CM

## 2020-12-29 PROCEDURE — ? POST-OP WOUND CHECK

## 2020-12-29 PROCEDURE — 99024 POSTOP FOLLOW-UP VISIT: CPT

## 2020-12-29 PROCEDURE — ? SUTURE REMOVAL (GLOBAL PERIOD)

## 2020-12-29 PROCEDURE — ? PHOTO-DOCUMENTATION

## 2020-12-29 ASSESSMENT — LOCATION DETAILED DESCRIPTION DERM: LOCATION DETAILED: STERNUM

## 2020-12-29 ASSESSMENT — LOCATION SIMPLE DESCRIPTION DERM: LOCATION SIMPLE: CHEST

## 2020-12-29 ASSESSMENT — LOCATION ZONE DERM: LOCATION ZONE: TRUNK

## 2020-12-29 NOTE — PROCEDURE: SUTURE REMOVAL (GLOBAL PERIOD)
Detail Level: Detailed
Add 78864 Cpt? (Important Note: In 2017 The Use Of 69898 Is Being Tracked By Cms To Determine Future Global Period Reimbursement For Global Periods): yes

## 2021-01-12 ENCOUNTER — APPOINTMENT (RX ONLY)
Dept: URBAN - METROPOLITAN AREA CLINIC 374 | Facility: CLINIC | Age: 61
Setting detail: DERMATOLOGY
End: 2021-01-12

## 2021-01-12 DIAGNOSIS — Z48.817 ENCOUNTER FOR SURGICAL AFTERCARE FOLLOWING SURGERY ON THE SKIN AND SUBCUTANEOUS TISSUE: ICD-10-CM

## 2021-01-12 PROCEDURE — 99024 POSTOP FOLLOW-UP VISIT: CPT

## 2021-01-12 PROCEDURE — ? RECOMMENDATIONS

## 2021-01-12 PROCEDURE — ? POST-OP WOUND CHECK

## 2021-01-12 PROCEDURE — ? PHOTO-DOCUMENTATION

## 2021-01-12 ASSESSMENT — LOCATION SIMPLE DESCRIPTION DERM: LOCATION SIMPLE: CHEST

## 2021-01-12 ASSESSMENT — LOCATION DETAILED DESCRIPTION DERM: LOCATION DETAILED: STERNUM

## 2021-01-12 ASSESSMENT — LOCATION ZONE DERM: LOCATION ZONE: TRUNK

## 2021-01-12 NOTE — PROCEDURE: POST-OP WOUND CHECK
Detail Level: Detailed
Add 53772 Cpt? (Important Note: In 2017 The Use Of 54874 Is Being Tracked By Cms To Determine Future Global Period Reimbursement For Global Periods): yes
Wound Evaluated By: EDINSON

## 2021-01-12 NOTE — PROCEDURE: RECOMMENDATIONS
Detail Level: Zone
Render Risk Assessment In Note?: no
Recommendation Preamble: The following recommendations were made during the visit:
Recommendations (Free Text): offered intralesional kenalog or re-excision, pt opts to continue warm compresses and observe area.  Will call if gets larger.  It is not bothering him at this time

## 2021-03-09 ENCOUNTER — OFFICE VISIT (OUTPATIENT)
Dept: FAMILY MEDICINE | Facility: CLINIC | Age: 61
End: 2021-03-09
Payer: COMMERCIAL

## 2021-03-09 VITALS
WEIGHT: 201 LBS | HEART RATE: 83 BPM | BODY MASS INDEX: 28.14 KG/M2 | SYSTOLIC BLOOD PRESSURE: 158 MMHG | HEIGHT: 71 IN | DIASTOLIC BLOOD PRESSURE: 104 MMHG | TEMPERATURE: 98.4 F | OXYGEN SATURATION: 98 % | RESPIRATION RATE: 14 BRPM

## 2021-03-09 DIAGNOSIS — R03.0 ELEVATED BLOOD PRESSURE READING WITHOUT DIAGNOSIS OF HYPERTENSION: ICD-10-CM

## 2021-03-09 DIAGNOSIS — Z00.00 PHYSICAL EXAM, ANNUAL: Primary | ICD-10-CM

## 2021-03-09 DIAGNOSIS — Z80.0 FAMILY HX OF COLON CANCER: ICD-10-CM

## 2021-03-09 DIAGNOSIS — E78.00 PURE HYPERCHOLESTEROLEMIA: ICD-10-CM

## 2021-03-09 PROCEDURE — 99396 PREV VISIT EST AGE 40-64: CPT | Performed by: FAMILY MEDICINE

## 2021-03-09 ASSESSMENT — ENCOUNTER SYMPTOMS
CONSTITUTIONAL NEGATIVE: 1
NEUROLOGICAL NEGATIVE: 1
MUSCULOSKELETAL NEGATIVE: 1
GASTROINTESTINAL NEGATIVE: 1
EYES NEGATIVE: 1
CARDIOVASCULAR NEGATIVE: 1
PSYCHIATRIC NEGATIVE: 1
RESPIRATORY NEGATIVE: 1
ENDOCRINE NEGATIVE: 1
HEMATOLOGIC/LYMPHATIC NEGATIVE: 1

## 2021-03-10 NOTE — ASSESSMENT & PLAN NOTE
Stressed the need to monitor blood pressure at home sleep for 4 weeks and report findings at that time.

## 2021-03-10 NOTE — PROGRESS NOTES
Subjective      Patient ID: Jaskaran Simpson is a 60 y.o. male.    He returns for routine annual physical exam and reports he has been feeling well with no new issues.  He is aware of his 20 pound weight gain which she attributes to the coronavirus and lack of activity.      The following have been reviewed and updated as appropriate in this visit:  Allergies  Meds  Problems       Review of Systems   Constitutional: Negative.    HENT: Negative.    Eyes: Negative.    Respiratory: Negative.    Cardiovascular: Negative.    Gastrointestinal: Negative.    Endocrine: Negative.    Genitourinary: Negative.    Musculoskeletal: Negative.    Skin: Negative.    Allergic/Immunologic: Negative for food allergies.   Neurological: Negative.    Hematological: Negative.    Psychiatric/Behavioral: Negative.      Past Medical History:   Diagnosis Date   • Hypertension      Family History   Problem Relation Age of Onset   • Hypertension Biological Mother    • Hyperlipidemia Biological Mother    • Colon cancer Biological Father      Social History     Socioeconomic History   • Marital status:      Spouse name: Not on file   • Number of children: Not on file   • Years of education: Not on file   • Highest education level: Not on file   Occupational History   • Not on file   Social Needs   • Financial resource strain: Not on file   • Food insecurity     Worry: Not on file     Inability: Not on file   • Transportation needs     Medical: Not on file     Non-medical: Not on file   Tobacco Use   • Smoking status: Current Every Day Smoker   • Smokeless tobacco: Never Used   Substance and Sexual Activity   • Alcohol use: Yes   • Drug use: Not Currently   • Sexual activity: Defer   Lifestyle   • Physical activity     Days per week: Not on file     Minutes per session: Not on file   • Stress: Not on file   Relationships   • Social connections     Talks on phone: Not on file     Gets together: Not on file     Attends [employer] service: Not  "on file     Active member of club or organization: Not on file     Attends meetings of clubs or organizations: Not on file     Relationship status: Not on file   • Intimate partner violence     Fear of current or ex partner: Not on file     Emotionally abused: Not on file     Physically abused: Not on file     Forced sexual activity: Not on file   Other Topics Concern   • Not on file   Social History Narrative   • Not on file     No Known Allergies        No current outpatient medications on file.     No current facility-administered medications for this visit.      Objective     Visit Vitals  BP (!) 158/104   Pulse 83   Temp 36.9 °C (98.4 °F) (Temporal)   Resp 14   Ht 1.803 m (5' 11\")   Wt 91.2 kg (201 lb)   SpO2 98%   BMI 28.03 kg/m²       Physical Exam  Constitutional:       Appearance: He is well-developed.   HENT:      Head: Normocephalic.   Eyes:      Conjunctiva/sclera: Conjunctivae normal.      Comments: Bilateral arcus   Neck:      Musculoskeletal: Neck supple.      Thyroid: No thyromegaly.   Cardiovascular:      Rate and Rhythm: Normal rate and regular rhythm.      Heart sounds: Normal heart sounds. No murmur.   Pulmonary:      Effort: Pulmonary effort is normal.      Breath sounds: Normal breath sounds.   Abdominal:      General: Bowel sounds are normal.      Palpations: Abdomen is soft. There is no mass.   Musculoskeletal:         General: No deformity.   Lymphadenopathy:      Cervical: No cervical adenopathy.   Skin:     General: Skin is warm and dry.      Findings: No rash.   Neurological:      Mental Status: He is alert and oriented to person, place, and time.   Psychiatric:         Behavior: Behavior normal.         Thought Content: Thought content normal.         Judgment: Judgment normal.         Assessment/Plan   Problem List Items Addressed This Visit        Endocrine/Metabolic    Hyperlipidemia       Other    Elevated blood pressure reading without diagnosis of hypertension     Stressed the need " to monitor blood pressure at home sleep for 4 weeks and report findings at that time.         Family hx of colon cancer     Referred for colonoscopy           Other Visit Diagnoses     Physical exam, annual    -  Primary    Recheck annually.  Full lab pending.    Relevant Orders    CBC    Comprehensive metabolic panel    Lipid panel    PSA    Urinalysis with microscopic        Héctor Dyer, DO  3/9/2021

## 2021-04-21 LAB
ALBUMIN SERPL-MCNC: 4.8 G/DL (ref 3.6–5.1)
ALBUMIN/GLOB SERPL: 1.9 (CALC) (ref 1–2.5)
ALP SERPL-CCNC: 55 U/L (ref 35–144)
ALT SERPL-CCNC: 19 U/L (ref 9–46)
APPEARANCE UR: CLEAR
AST SERPL-CCNC: 17 U/L (ref 10–35)
BACTERIA #/AREA URNS HPF: ABNORMAL /HPF
BILIRUB SERPL-MCNC: 1.5 MG/DL (ref 0.2–1.2)
BILIRUB UR QL STRIP: NEGATIVE
BUN SERPL-MCNC: 13 MG/DL (ref 7–25)
BUN/CREAT SERPL: ABNORMAL (CALC) (ref 6–22)
CALCIUM SERPL-MCNC: 9.6 MG/DL (ref 8.6–10.3)
CHLORIDE SERPL-SCNC: 101 MMOL/L (ref 98–110)
CHOLEST SERPL-MCNC: 269 MG/DL
CHOLEST/HDLC SERPL: 4.3 (CALC)
CO2 SERPL-SCNC: 30 MMOL/L (ref 20–32)
COLOR UR: ABNORMAL
CREAT SERPL-MCNC: 0.99 MG/DL (ref 0.7–1.33)
ERYTHROCYTE [DISTWIDTH] IN BLOOD BY AUTOMATED COUNT: 12.8 % (ref 11–15)
GLOBULIN SER CALC-MCNC: 2.5 G/DL (CALC) (ref 1.9–3.7)
GLUCOSE SERPL-MCNC: 100 MG/DL (ref 65–99)
GLUCOSE UR QL STRIP: NEGATIVE
HCT VFR BLD AUTO: 43.9 % (ref 38.5–50)
HDLC SERPL-MCNC: 62 MG/DL
HGB BLD-MCNC: 14.9 G/DL (ref 13.2–17.1)
HGB UR QL STRIP: NEGATIVE
HYALINE CASTS #/AREA URNS LPF: ABNORMAL /LPF
KETONES UR QL STRIP: ABNORMAL
LDLC SERPL CALC-MCNC: 189 MG/DL (CALC)
LEUKOCYTE ESTERASE UR QL STRIP: NEGATIVE
MCH RBC QN AUTO: 33 PG (ref 27–33)
MCHC RBC AUTO-ENTMCNC: 33.9 G/DL (ref 32–36)
MCV RBC AUTO: 97.3 FL (ref 80–100)
NITRITE UR QL STRIP: NEGATIVE
NONHDLC SERPL-MCNC: 207 MG/DL (CALC)
PH UR STRIP: 6 [PH] (ref 5–8)
PLATELET # BLD AUTO: 222 THOUSAND/UL (ref 140–400)
PMV BLD REES-ECKER: 9.8 FL (ref 7.5–12.5)
POTASSIUM SERPL-SCNC: 4.4 MMOL/L (ref 3.5–5.3)
PROT SERPL-MCNC: 7.3 G/DL (ref 6.1–8.1)
PROT UR QL STRIP: NEGATIVE
PSA SERPL-MCNC: 1.7 NG/ML
QUEST EGFR AFRICAN AMERICAN: 96 ML/MIN/1.73M2
QUEST EGFR NON-AFR. AMERICAN: 83 ML/MIN/1.73M2
RBC # BLD AUTO: 4.51 MILLION/UL (ref 4.2–5.8)
RBC #/AREA URNS HPF: ABNORMAL /HPF
SODIUM SERPL-SCNC: 137 MMOL/L (ref 135–146)
SP GR UR STRIP: 1.02 (ref 1–1.03)
SQUAMOUS #/AREA URNS HPF: ABNORMAL /HPF
TRIGL SERPL-MCNC: 74 MG/DL
WBC # BLD AUTO: 5.9 THOUSAND/UL (ref 3.8–10.8)
WBC #/AREA URNS HPF: ABNORMAL /HPF

## 2021-04-27 ENCOUNTER — TELEPHONE (OUTPATIENT)
Dept: FAMILY MEDICINE | Facility: CLINIC | Age: 61
End: 2021-04-27

## 2021-04-27 NOTE — TELEPHONE ENCOUNTER
Patient aware of results and recommendation of Crestor. He does not want to start any medications at this time and will work on his diet to see if that improves his levels. He wants to get the labs done in June to see where he is at at that point.

## 2021-04-28 DIAGNOSIS — E78.00 PURE HYPERCHOLESTEROLEMIA: Primary | ICD-10-CM

## 2022-03-01 ENCOUNTER — APPOINTMENT (RX ONLY)
Dept: URBAN - METROPOLITAN AREA CLINIC 374 | Facility: CLINIC | Age: 62
Setting detail: DERMATOLOGY
End: 2022-03-01

## 2022-03-01 DIAGNOSIS — L21.8 OTHER SEBORRHEIC DERMATITIS: ICD-10-CM | Status: STABLE

## 2022-03-01 DIAGNOSIS — D22 MELANOCYTIC NEVI: ICD-10-CM

## 2022-03-01 DIAGNOSIS — L82.0 INFLAMED SEBORRHEIC KERATOSIS: ICD-10-CM

## 2022-03-01 PROBLEM — D22.5 MELANOCYTIC NEVI OF TRUNK: Status: ACTIVE | Noted: 2022-03-01

## 2022-03-01 PROCEDURE — ? PRESCRIPTION

## 2022-03-01 PROCEDURE — ? ADDITIONAL NOTES

## 2022-03-01 PROCEDURE — ? BENIGN DESTRUCTION

## 2022-03-01 PROCEDURE — ? COUNSELING

## 2022-03-01 PROCEDURE — ? PHOTO-DOCUMENTATION

## 2022-03-01 PROCEDURE — 99213 OFFICE O/P EST LOW 20 MIN: CPT | Mod: 25

## 2022-03-01 PROCEDURE — ? MEDICATION COUNSELING

## 2022-03-01 PROCEDURE — 17110 DESTRUCTION B9 LES UP TO 14: CPT

## 2022-03-01 PROCEDURE — ? PRESCRIPTION MEDICATION MANAGEMENT

## 2022-03-01 RX ORDER — TRIAMCINOLONE ACETONIDE 1 MG/ML
1 LOTION TOPICAL BID
Qty: 60 | Refills: 3 | Status: ERX | COMMUNITY
Start: 2022-03-01

## 2022-03-01 RX ADMIN — TRIAMCINOLONE ACETONIDE 1: 1 LOTION TOPICAL at 00:00

## 2022-03-01 ASSESSMENT — LOCATION SIMPLE DESCRIPTION DERM
LOCATION SIMPLE: LEFT LOWER BACK
LOCATION SIMPLE: CHEST
LOCATION SIMPLE: SCALP
LOCATION SIMPLE: GROIN
LOCATION SIMPLE: RIGHT FOREHEAD

## 2022-03-01 ASSESSMENT — LOCATION ZONE DERM
LOCATION ZONE: TRUNK
LOCATION ZONE: SCALP
LOCATION ZONE: FACE

## 2022-03-01 ASSESSMENT — LOCATION DETAILED DESCRIPTION DERM
LOCATION DETAILED: LEFT CENTRAL PARIETAL SCALP
LOCATION DETAILED: RIGHT SUPERIOR LATERAL FOREHEAD
LOCATION DETAILED: LEFT SUPERIOR MEDIAL MIDBACK
LOCATION DETAILED: STERNUM
LOCATION DETAILED: SUPRAPUBIC SKIN

## 2022-03-01 NOTE — PROCEDURE: PRESCRIPTION MEDICATION MANAGEMENT
Initiate Treatment: triamcinolone acetonide 0.1 % lotion: apply a thin layer to AA of chest BID prn flare
Render In Strict Bullet Format?: No
Detail Level: Simple

## 2022-03-21 NOTE — PROCEDURE: POST-OP WOUND CHECK
Detail Level: Detailed
Add 96849 Cpt? (Important Note: In 2017 The Use Of 25062 Is Being Tracked By Cms To Determine Future Global Period Reimbursement For Global Periods): no
Wound Evaluated By: EDINSON
5

## 2022-04-08 ENCOUNTER — OFFICE VISIT (OUTPATIENT)
Dept: FAMILY MEDICINE | Facility: CLINIC | Age: 62
End: 2022-04-08
Payer: COMMERCIAL

## 2022-04-08 VITALS
TEMPERATURE: 98.1 F | WEIGHT: 190.4 LBS | RESPIRATION RATE: 14 BRPM | DIASTOLIC BLOOD PRESSURE: 74 MMHG | SYSTOLIC BLOOD PRESSURE: 144 MMHG | BODY MASS INDEX: 27.26 KG/M2 | HEART RATE: 73 BPM | OXYGEN SATURATION: 98 % | HEIGHT: 70 IN

## 2022-04-08 DIAGNOSIS — R20.2 PARESTHESIA OF ARM: ICD-10-CM

## 2022-04-08 DIAGNOSIS — Z00.00 ROUTINE ADULT HEALTH MAINTENANCE: Primary | ICD-10-CM

## 2022-04-08 PROCEDURE — 3078F DIAST BP <80 MM HG: CPT | Performed by: NURSE PRACTITIONER

## 2022-04-08 PROCEDURE — 3077F SYST BP >= 140 MM HG: CPT | Performed by: NURSE PRACTITIONER

## 2022-04-08 PROCEDURE — 3008F BODY MASS INDEX DOCD: CPT | Performed by: NURSE PRACTITIONER

## 2022-04-08 PROCEDURE — 99396 PREV VISIT EST AGE 40-64: CPT | Performed by: NURSE PRACTITIONER

## 2022-04-08 RX ORDER — GLUCOSAMINE/CHONDRO SU A 500-400 MG
1 TABLET ORAL 3 TIMES DAILY
COMMUNITY
End: 2023-11-21

## 2022-04-08 RX ORDER — TURMERIC 400 MG
CAPSULE ORAL
COMMUNITY
End: 2023-11-21

## 2022-04-08 RX ORDER — GLUCOSAM/CHONDRO/HERB 149/HYAL 750-100 MG
TABLET ORAL 2 TIMES DAILY
COMMUNITY
End: 2023-11-21

## 2022-04-08 ASSESSMENT — ENCOUNTER SYMPTOMS
WHEEZING: 0
LIGHT-HEADEDNESS: 0
DIARRHEA: 0
HEADACHES: 0
ABDOMINAL PAIN: 0
SHORTNESS OF BREATH: 0
VOMITING: 0
CHILLS: 0
FEVER: 0
COUGH: 0
FATIGUE: 0
CONSTIPATION: 0
NAUSEA: 0

## 2022-04-08 ASSESSMENT — PAIN SCALES - GENERAL: PAINLEVEL: 0-NO PAIN

## 2022-04-08 NOTE — PROGRESS NOTES
[facility]  [address] [place] [address]  [phone]     Reason for visit:   Chief Complaint   Patient presents with   • Annual Exam     Pt here for PE and states that he has a pain in his upper back that he only feels when he is sleeping and his arms go tingly      HPI   Jaskaran Simpson is a 61 y.o. male who presents for a routine well visit.        Employed- Works at [employer] 30+ years  Fun activities - family time, fix things at home  Ever feel down/depressed - no  SI/HI -no  Diet - 17 lbs weight loss in the last 3 weeks. Diet improvments and holding alcohol  Smokes 1 pack every 2-3 days x 4 years after quitting for 20 years  Screen time - moderate  Alcohol -not recently  Drugs -  Menses -   Sleep - could be better, L shoulder discomfort causes bilateral pins and needles in both hands  Last dental visit - due, goal for 2022  Ophthalmology -  Dermatology -  Seatbelt use -  Sexually active -   Mammogram -  Colonoscopy - due  Cervical Cancer Screening -    Social History:  Social History     Social History Narrative   • Not on file       Medical History:  Past Medical History:   Diagnosis Date   • Hypertension        Surgical History:  Past Surgical History:   Procedure Laterality Date   • KNEE SURGERY           Family History:  Family History   Problem Relation Age of Onset   • Hypertension Biological Mother    • Hyperlipidemia Biological Mother    • Colon cancer Biological Father        Allergies:  Patient has no known allergies.    Current Medications:  Current Outpatient Medications   Medication Sig Dispense Refill   • glucosamine-chondroitin 500-400 mg tablet Take 1 tablet by mouth 3 (three) times a day.     • omega 3-dha-epa-fish oil (Fish OiL) 1,000 mg (120 mg-180 mg) capsule Take by mouth 2 (two) times a day.     • turmeric 400 mg capsule Take by mouth.       No current facility-administered medications for this visit.       Review of Systems:  Review of Systems    Constitutional: Negative for chills, fatigue and fever.   Respiratory: Negative for cough, shortness of breath and wheezing.    Cardiovascular: Negative for chest pain.   Gastrointestinal: Negative for abdominal pain, constipation, diarrhea, nausea and vomiting.   Neurological: Negative for light-headedness and headaches.       Objective     Vital Signs:  Vitals:    04/08/22 1106   BP: (!) 144/74   Pulse: 73   Resp: 14   Temp: 36.7 °C (98.1 °F)   SpO2: 98%       BMI:  Body mass index is 27.32 kg/m².     Physical Exam  Constitutional:       Appearance: Normal appearance.   HENT:      Head: Normocephalic and atraumatic.      Right Ear: Hearing normal. Tympanic membrane is bulging.      Left Ear: Hearing normal. Tympanic membrane is bulging.      Ears:      Comments: Nose/mouth deferred, masked  Cardiovascular:      Rate and Rhythm: Normal rate and regular rhythm.      Heart sounds: Normal heart sounds.   Pulmonary:      Effort: Pulmonary effort is normal.      Breath sounds: Normal breath sounds.   Neurological:      General: No focal deficit present.      Mental Status: He is alert and oriented to person, place, and time.   Psychiatric:         Attention and Perception: Attention and perception normal.         Mood and Affect: Mood and affect normal.         Speech: Speech normal.         Behavior: Behavior normal. Behavior is cooperative.         Thought Content: Thought content normal.         Cognition and Memory: Cognition normal.         Judgment: Judgment normal.         Recent labs before today:     Lab Results   Component Value Date    WBC 5.9 04/20/2021    HGB 14.9 04/20/2021    HCT 43.9 04/20/2021     04/20/2021    CHOL 269 (H) 04/20/2021    TRIG 74 04/20/2021    HDL 62 04/20/2021    ALT 19 04/20/2021    AST 17 04/20/2021     04/20/2021    K 4.4 04/20/2021     04/20/2021    CREATININE 0.99 04/20/2021    BUN 13 04/20/2021    CO2 30 04/20/2021    PSA 1.7 04/20/2021        Procedures    Assessment     Patient Instructions   Today we discussed a healthy diet with fruits, vegetables, and low-fat items with a focus on complex carbohydrates. Regular physical activity is encouraged with a goal of 30 minutes of cardio most days of the week with some muscle strengthening.    Try to limit caffeine to less than 24 ounces per day, replenish with plenty of water. Hydration has a bigger impact on your health than you think!    Reviewed safe alcohol habits. If you drink while out of the house, plan for a designated . Never drink and drive. Avoid THC use, smoking, or vaping.    Reviewed safe sexual practices, if sexually active always use a condom to discourage STD transmission.     Routine assessments by specialists such as OBGYN, cardiology, GI, etc. were encouraged if applicable.     Watch for changes in bowel habits, especially black or bloody stools.     I encourage regular Opthalmology visit for eye check, Dr. Samara Phelps and Dr. Ngozi Roland in [place] are very good resources for this, their office number is [phone]    I also recommend routine Dermatology visits every 1-2 years for a skin check, Dr. Elton Gongora in [place] [phone] or Dr. Chayito Cummings in [place] [phone].    Patient received Pfizer covid vaccine x3     Fasting labs, follow up based on results.         Problem List Items Addressed This Visit    None     Visit Diagnoses     Routine adult health maintenance    -  Primary    Relevant Orders    Direct Access Colonoscopy [facility]    CBC    Comprehensive metabolic panel    Hemoglobin A1c    Lipid panel    PSA    Urinalysis with microscopic    Paresthesia of arm        Relevant Orders    X-RAY THORACIC SPINE 4+ VIEWS                    Ezekiel Naik, RADHA, LISA  4/8/2022    This document was created using Dragon dictation software.  There might be some typographical errors due to this technology.

## 2022-04-08 NOTE — PATIENT INSTRUCTIONS
Today we discussed a healthy diet with fruits, vegetables, and low-fat items with a focus on complex carbohydrates. Regular physical activity is encouraged with a goal of 30 minutes of cardio most days of the week with some muscle strengthening.    Try to limit caffeine to less than 24 ounces per day, replenish with plenty of water. Hydration has a bigger impact on your health than you think!    Reviewed safe alcohol habits. If you drink while out of the house, plan for a designated . Never drink and drive. Avoid THC use, smoking, or vaping.    Reviewed safe sexual practices, if sexually active always use a condom to discourage STD transmission.     Routine assessments by specialists such as OBGYN, cardiology, GI, etc. were encouraged if applicable.     Watch for changes in bowel habits, especially black or bloody stools.     I encourage regular Opthalmology visit for eye check, Dr. Samara Phelps and Dr. Ngozi Roland in [place] are very good resources for this, their office number is [phone]    I also recommend routine Dermatology visits every 1-2 years for a skin check, Dr. Elton Gongora in [place] [phone] or Dr. Chayito Cummings in [place] [phone].    Patient received Pfizer covid vaccine x3     Fasting labs, follow up based on results.

## 2022-04-13 ENCOUNTER — DOCUMENTATION (OUTPATIENT)
Dept: SURGERY | Facility: CLINIC | Age: 62
End: 2022-04-13
Payer: COMMERCIAL

## 2022-05-06 LAB
ALBUMIN SERPL-MCNC: 4.7 G/DL (ref 3.6–5.1)
ALBUMIN/GLOB SERPL: 1.8 (CALC) (ref 1–2.5)
ALP SERPL-CCNC: 61 U/L (ref 35–144)
ALT SERPL-CCNC: 16 U/L (ref 9–46)
APPEARANCE UR: CLEAR
AST SERPL-CCNC: 16 U/L (ref 10–35)
BACTERIA #/AREA URNS HPF: ABNORMAL /HPF
BILIRUB SERPL-MCNC: 1.6 MG/DL (ref 0.2–1.2)
BILIRUB UR QL STRIP: NEGATIVE
BUN SERPL-MCNC: 16 MG/DL (ref 7–25)
BUN/CREAT SERPL: ABNORMAL (CALC) (ref 6–22)
CALCIUM SERPL-MCNC: 9.6 MG/DL (ref 8.6–10.3)
CHLORIDE SERPL-SCNC: 100 MMOL/L (ref 98–110)
CHOLEST SERPL-MCNC: 273 MG/DL
CHOLEST/HDLC SERPL: 4.1 (CALC)
CO2 SERPL-SCNC: 27 MMOL/L (ref 20–32)
COLOR UR: YELLOW
CREAT SERPL-MCNC: 0.98 MG/DL (ref 0.7–1.25)
ERYTHROCYTE [DISTWIDTH] IN BLOOD BY AUTOMATED COUNT: 12.6 % (ref 11–15)
GLOBULIN SER CALC-MCNC: 2.6 G/DL (CALC) (ref 1.9–3.7)
GLUCOSE SERPL-MCNC: 82 MG/DL (ref 65–99)
GLUCOSE UR QL STRIP: NEGATIVE
HBA1C MFR BLD: 5.1 % OF TOTAL HGB
HCT VFR BLD AUTO: 43.9 % (ref 38.5–50)
HDLC SERPL-MCNC: 67 MG/DL
HGB BLD-MCNC: 15.1 G/DL (ref 13.2–17.1)
HGB UR QL STRIP: NEGATIVE
HYALINE CASTS #/AREA URNS LPF: ABNORMAL /LPF
KETONES UR QL STRIP: ABNORMAL
LDLC SERPL CALC-MCNC: 185 MG/DL (CALC)
LEUKOCYTE ESTERASE UR QL STRIP: NEGATIVE
MCH RBC QN AUTO: 33.3 PG (ref 27–33)
MCHC RBC AUTO-ENTMCNC: 34.4 G/DL (ref 32–36)
MCV RBC AUTO: 96.7 FL (ref 80–100)
NITRITE UR QL STRIP: NEGATIVE
NONHDLC SERPL-MCNC: 206 MG/DL (CALC)
PH UR STRIP: 6 [PH] (ref 5–8)
PLATELET # BLD AUTO: 232 THOUSAND/UL (ref 140–400)
PMV BLD REES-ECKER: 9.5 FL (ref 7.5–12.5)
POTASSIUM SERPL-SCNC: 4 MMOL/L (ref 3.5–5.3)
PROT SERPL-MCNC: 7.3 G/DL (ref 6.1–8.1)
PROT UR QL STRIP: NEGATIVE
PSA SERPL-MCNC: 1.46 NG/ML
RBC # BLD AUTO: 4.54 MILLION/UL (ref 4.2–5.8)
RBC #/AREA URNS HPF: ABNORMAL /HPF
SODIUM SERPL-SCNC: 139 MMOL/L (ref 135–146)
SP GR UR STRIP: 1.02 (ref 1–1.03)
SQUAMOUS #/AREA URNS HPF: ABNORMAL /HPF
TRIGL SERPL-MCNC: 93 MG/DL
WBC # BLD AUTO: 6.6 THOUSAND/UL (ref 3.8–10.8)
WBC #/AREA URNS HPF: ABNORMAL /HPF

## 2022-06-20 NOTE — PROCEDURE: MEDICATION COUNSELING
PT IS SLEEPING AND EASILY AROUSABLE, WILL CONTINUE TO MONITOR Azithromycin Counseling:  I discussed with the patient the risks of azithromycin including but not limited to GI upset, allergic reaction, drug rash, diarrhea, and yeast infections.

## 2022-06-21 NOTE — PROCEDURE: BENIGN DESTRUCTION
Pre-op procedure complete. No distress noted.  
Render Note In Bullet Format When Appropriate: No
Anesthesia Volume In Cc: 0.5
Post-Care Instructions: I reviewed with the patient in detail post-care instructions. Patient is to wear sunprotection, and avoid picking at any of the treated lesions. Pt may apply Vaseline to crusted or scabbing areas.
Detail Level: Detailed
Treatment Number (Will Not Render If 0): 1
Medical Necessity Information: It is in your best interest to select a reason for this procedure from the list below. All of these items fulfill various CMS LCD requirements except the new and changing color options.
Consent: The patient's consent was obtained including but not limited to risks of crusting, scabbing, blistering, scarring, darker or lighter pigmentary change, recurrence, incomplete removal and infection.
Medical Necessity Clause: This procedure was medically necessary because the lesions that were treated were:

## 2022-07-14 NOTE — PROCEDURE: MEDICATION COUNSELING
Topical Sulfur Applications Pregnancy And Lactation Text: This medication is Pregnancy Category C and has an unknown safety profile during pregnancy. It is unknown if this topical medication is excreted in breast milk. Dupixent Counseling: I discussed with the patient the risks of dupilumab including but not limited to eye infection and irritation, cold sores, injection site reactions, worsening of asthma, allergic reactions and increased risk of parasitic infection.  Live vaccines should be avoided while taking dupilumab. Dupilumab will also interact with certain medications such as warfarin and cyclosporine. The patient understands that monitoring is required and they must alert us or the primary physician if symptoms of infection or other concerning signs are noted.

## 2022-11-10 ENCOUNTER — PREP FOR CASE (OUTPATIENT)
Dept: SURGERY | Facility: CLINIC | Age: 62
End: 2022-11-10
Payer: COMMERCIAL

## 2022-11-10 ENCOUNTER — DOCUMENTATION (OUTPATIENT)
Dept: SURGERY | Facility: CLINIC | Age: 62
End: 2022-11-10
Payer: COMMERCIAL

## 2022-11-10 ASSESSMENT — ENCOUNTER SYMPTOMS
CARDIOVASCULAR NEGATIVE: 1
RESPIRATORY NEGATIVE: 1
PSYCHIATRIC NEGATIVE: 1
NEUROLOGICAL NEGATIVE: 1
GASTROINTESTINAL NEGATIVE: 1
EYES NEGATIVE: 1

## 2022-11-10 NOTE — PROGRESS NOTES
Questions YES NO   1 Do you have a personal history of Inflammatory Bowel Disease, such as ulcerative colitis or Crohns disease?  x    If no, proceed to question #2.    If Yes Stop the assessment and advise the patient to see their gastroenterologist.   2 Do you have a Personal history of colon cancer or rectal cancer?  x    If not, proceed to question 3.    If yes, were you managed by Dr. Mills or Dr. Corona?      If patient were managed by one of the Providers, go to question 3.    If patient were not managed by one of the providers, please make an appointment with Dr. Mills or Dr. Corona.   3 Do you have any of the following symptoms?    Change in bowel habits  x    Bleeding with bowel movements or rectal bleeding  x    Hemorrhoids x     Narrow Stools sometimes     Fatigue  x    Weight Loss  x    Decreased appetite  x    Rectal or abdominal pan  x    Anemia  x    If less than 3 symptoms, proceed to question 4.    If more than 3 symptoms, schedule an appointment with Dr. Mills or Chloe.   4 Have you had a colonoscopy in the last 10 years? x     If no, proceed to question 5.    If yes, when were you recommended to have a follow up colonoscopy? 5 Years    If this colonoscopy is on or after the recommended time interval, please proceed to question 5.    If this colonoscopy is before the recommended time interval, please advise the patient to make an appointment to with Dr. Mills or Dr. Corona, then proceed to question 5.   5 Do you have a personal history of colon or rectal polyps? x    6 Do you have a family history of colon cancer or rectal cancer? x    7 Do you have a family history of any other cancers?  x     Last scope 2012    Father - colon ca

## 2022-11-10 NOTE — H&P
Chief Complaint: No chief complaint on file.  .    HPI     Patient is a 62 y.o. male who presents for a screening colonoscopy.  Patient's last colonoscopy was approximately 10 years ago.  Patient does have a family history of colon cancer (father).    Medical History:   Past Medical History:   Diagnosis Date    Hypertension        Surgical History:   Past Surgical History:   Procedure Laterality Date    KNEE SURGERY         Social History:   Social History     Social History Narrative    Not on file       Family History:   Family History   Problem Relation Age of Onset    Hypertension Biological Mother     Hyperlipidemia Biological Mother     Colon cancer Biological Father        Allergies: Patient has no known allergies.    Home Medications:  Not in a hospital admission.    Review of Systems   Eyes: Negative.    Respiratory: Negative.    Cardiovascular: Negative.    Gastrointestinal: Negative.    Skin: Negative.    Neurological: Negative.    Psychiatric/Behavioral: Negative.    All other systems reviewed and are negative.       Objective     Vital Signs for the last 24 hours:  BP: ()/()   Arterial Line BP: ()/()     Physical Exam  None  Problem List Items Addressed This Visit    None    62-year-old male being brought in for screening colonoscopy    Ezekiel Dailey MD 11/10/2022 4:03 PM

## 2022-12-20 ENCOUNTER — TELEPHONE (OUTPATIENT)
Dept: OPERATING ROOM | Facility: HOSPITAL | Age: 62
End: 2022-12-20
Payer: COMMERCIAL

## 2023-03-14 PROCEDURE — 45380 COLONOSCOPY AND BIOPSY: CPT | Mod: 33,XU | Performed by: SURGERY

## 2023-03-14 PROCEDURE — 45385 COLONOSCOPY W/LESION REMOVAL: CPT | Mod: 33 | Performed by: SURGERY

## 2023-03-22 ENCOUNTER — TELEPHONE (OUTPATIENT)
Dept: SURGERY | Facility: CLINIC | Age: 63
End: 2023-03-22
Payer: COMMERCIAL

## 2023-11-21 ENCOUNTER — OFFICE VISIT (OUTPATIENT)
Dept: FAMILY MEDICINE | Facility: CLINIC | Age: 63
End: 2023-11-21
Payer: COMMERCIAL

## 2023-11-21 VITALS
TEMPERATURE: 97.3 F | HEART RATE: 77 BPM | WEIGHT: 211.2 LBS | OXYGEN SATURATION: 98 % | SYSTOLIC BLOOD PRESSURE: 152 MMHG | DIASTOLIC BLOOD PRESSURE: 90 MMHG | BODY MASS INDEX: 30.24 KG/M2 | HEIGHT: 70 IN

## 2023-11-21 DIAGNOSIS — E78.00 PURE HYPERCHOLESTEROLEMIA: ICD-10-CM

## 2023-11-21 DIAGNOSIS — G89.29 CHRONIC PERISCAPULAR PAIN ON LEFT SIDE: ICD-10-CM

## 2023-11-21 DIAGNOSIS — Z80.0 FAMILY HX OF COLON CANCER: ICD-10-CM

## 2023-11-21 DIAGNOSIS — I10 ESSENTIAL HYPERTENSION, BENIGN: ICD-10-CM

## 2023-11-21 DIAGNOSIS — M25.512 CHRONIC PERISCAPULAR PAIN ON LEFT SIDE: ICD-10-CM

## 2023-11-21 DIAGNOSIS — M54.12 CERVICAL RADICULITIS: ICD-10-CM

## 2023-11-21 DIAGNOSIS — Z00.00 ROUTINE HEALTH MAINTENANCE: Primary | ICD-10-CM

## 2023-11-21 PROBLEM — R03.0 ELEVATED BLOOD PRESSURE READING WITHOUT DIAGNOSIS OF HYPERTENSION: Status: RESOLVED | Noted: 2019-12-03 | Resolved: 2023-11-21

## 2023-11-21 PROCEDURE — 3080F DIAST BP >= 90 MM HG: CPT | Performed by: FAMILY MEDICINE

## 2023-11-21 PROCEDURE — 99386 PREV VISIT NEW AGE 40-64: CPT | Performed by: FAMILY MEDICINE

## 2023-11-21 PROCEDURE — 3077F SYST BP >= 140 MM HG: CPT | Performed by: FAMILY MEDICINE

## 2023-11-21 PROCEDURE — 3008F BODY MASS INDEX DOCD: CPT | Performed by: FAMILY MEDICINE

## 2023-11-21 ASSESSMENT — ENCOUNTER SYMPTOMS
DYSURIA: 0
EYE PAIN: 0
RHINORRHEA: 0
VOMITING: 0
HEMATURIA: 0
NAUSEA: 0
NUMBNESS: 1
PALPITATIONS: 0
ABDOMINAL PAIN: 0
CHILLS: 0
COUGH: 0
WEAKNESS: 0
FEVER: 0
ARTHRALGIAS: 1
MYALGIAS: 1
COLOR CHANGE: 0
SHORTNESS OF BREATH: 0

## 2023-11-21 NOTE — PATIENT INSTRUCTIONS
[facility]    [address] [place] [address]    [email or web address]    [phone]    [phone] [place] [address]

## 2023-11-21 NOTE — PROGRESS NOTES
Chief Complaint   Patient presents with    NPV-CPE        HPI: Jaskaran Simpson is a 63 y.o. year old male who presents today for annual physical and no other complaints today.     Elevated pressures at the doctor's office. This is going on 35-40 years. He has not been monitoring at home lately, but has done so in the past.    Currently smoker. 1/2 PPD. Quit for 20 years previously. Thinking of quitting again.    Bilateral hand numbness if sleeping on his side, but not on back. Chronic left periscapular pain/spasm. Has seen chiropractor for it before which has helped temporarily.     Cancer screening:  Colonoscopy: Up to date - will obtain records  Prostate: Due     Immunizations: Due for flu, COVID.   Dental exam: Up to date  Eye exam: Up to date     Exercise: On feet all day at work.  Diet: Overeating lately.     Depression Screening:           Patient Active Problem List   Diagnosis    Essential hypertension, benign    Family hx of colon cancer    Pure hypercholesterolemia    Chronic periscapular pain on left side    Cervical radiculitis    Routine health maintenance        Past Medical History:   Diagnosis Date    Hypertension         Past Surgical History:   Procedure Laterality Date    KNEE SURGERY          Family History   Problem Relation Age of Onset    Hypertension Biological Mother     Hyperlipidemia Biological Mother     Heart attack Biological Mother     Colon cancer Biological Father     Heart attack Biological Father     Prostate cancer Neg Hx         Social History     Socioeconomic History    Marital status:      Spouse name: Not on file    Number of children: Not on file    Years of education: Not on file    Highest education level: Not on file   Occupational History     Comment: Works for [employer].   Tobacco Use    Smoking status: Every Day     Packs/day: 0.50     Years: 20.00     Additional pack years: 0.00     Total pack years: 10.00     Types: Cigarettes    Smokeless  "tobacco: Never   Substance and Sexual Activity    Alcohol use: Yes     Comment: 2 drinks per day    Drug use: Not Currently    Sexual activity: Defer   Other Topics Concern    Not on file   Social History Narrative    Not on file     Social Determinants of Health     Financial Resource Strain: Not on file   Food Insecurity: Not on file   Transportation Needs: Not on file   Physical Activity: Not on file   Stress: Not on file   Social Connections: Not on file   Intimate Partner Violence: Not on file   Housing Stability: Not on file        No current outpatient medications on file prior to visit.     No current facility-administered medications on file prior to visit.        No Known Allergies     Review of Systems   Constitutional: Negative for chills and fever.   HENT: Negative for congestion and rhinorrhea.    Eyes: Negative for pain and visual disturbance.   Respiratory: Negative for cough and shortness of breath.    Cardiovascular: Negative for chest pain and palpitations.   Gastrointestinal: Negative for abdominal pain, nausea and vomiting.   Genitourinary: Negative for dysuria and hematuria.        Some mild sexual dysfunction   Musculoskeletal: Positive for arthralgias and myalgias.   Skin: Negative for color change and rash.   Neurological: Positive for numbness. Negative for weakness.        Visit Vitals  BP (!) 152/90 (BP Location: Left upper arm, Patient Position: Sitting)   Pulse 77   Temp 36.3 °C (97.3 °F) (Temporal)   Ht 1.778 m (5' 10\")   Wt 95.8 kg (211 lb 3.2 oz)   SpO2 98%   BMI 30.30 kg/m²        Physical Exam  Vitals reviewed.   Constitutional:       General: He is not in acute distress.     Appearance: Normal appearance. He is not ill-appearing.   HENT:      Head: Normocephalic and atraumatic.      Right Ear: Tympanic membrane and ear canal normal.      Left Ear: Tympanic membrane and ear canal normal.      Nose: Nose normal. No congestion or rhinorrhea.      Mouth/Throat:      Mouth: Mucous " "membranes are moist.      Pharynx: Oropharynx is clear. No oropharyngeal exudate.   Eyes:      General:         Right eye: No discharge.         Left eye: No discharge.      Conjunctiva/sclera: Conjunctivae normal.      Pupils: Pupils are equal, round, and reactive to light.      Comments: Arcus senilis vs developing peripheral cataract   Cardiovascular:      Rate and Rhythm: Normal rate and regular rhythm.      Heart sounds: No murmur heard.  Pulmonary:      Effort: Pulmonary effort is normal.      Breath sounds: Normal breath sounds. No wheezing, rhonchi or rales.   Abdominal:      General: Bowel sounds are normal. There is no distension.      Palpations: Abdomen is soft.      Tenderness: There is no abdominal tenderness.   Musculoskeletal:         General: No swelling or deformity.      Cervical back: Normal range of motion and neck supple.   Lymphadenopathy:      Cervical: No cervical adenopathy.   Skin:     General: Skin is warm and dry.   Neurological:      General: No focal deficit present.      Mental Status: He is alert and oriented to person, place, and time.   Psychiatric:         Mood and Affect: Mood normal.         Behavior: Behavior normal.          Lab Results   Component Value Date    GLUCOSE 82 05/06/2022    GLUCOSE NEGATIVE 05/06/2022    CALCIUM 9.6 05/06/2022     05/06/2022    K 4.0 05/06/2022    CO2 27 05/06/2022     05/06/2022    BUN 16 05/06/2022    CREATININE 0.98 05/06/2022       Lab Results   Component Value Date    CHOL 273 (H) 05/06/2022    CHOL 269 (H) 04/20/2021    CHOL 230 (H) 12/10/2019     Lab Results   Component Value Date    HDL 67 05/06/2022    HDL 62 04/20/2021    HDL 52 12/10/2019     Lab Results   Component Value Date    LDLCALC 185 (H) 05/06/2022    LDLCALC 189 (H) 04/20/2021    LDLCALC 163 (H) 12/10/2019     Lab Results   Component Value Date    TRIG 93 05/06/2022    TRIG 74 04/20/2021    TRIG 59 12/10/2019     No results found for: \"CHOLHDL\"    Lab Results "   Component Value Date    HGBA1C 5.1 05/06/2022          Assessment / Plan          Assessment   Problem List Items Addressed This Visit        Nervous    Cervical radiculitis    Relevant Orders    Ambulatory referral to Physical Therapy       Circulatory    Essential hypertension, benign     Home monitoring.  F/u with recordings in 3 weeks.            Musculoskeletal    Chronic periscapular pain on left side    Relevant Orders    Ambulatory referral to Physical Therapy       Other    Family hx of colon cancer    Pure hypercholesterolemia    Routine health maintenance - Primary     Routine labs ordered.  Multiple vascular risk factors:  - priority 1 is quitting smoking. Cessation counseling provided. He is thinking about it.  - home monitoring of BP and f/u in 3 weeks to review readings.    Check labs including cholesterol panel. May get calcium score, depending on results.  Could probably benefit from a stress in the future given risk factors and family history, but this is not urgent as he is not having anginal symptoms.    Colonoscopy UTD - obtain records.    PSA ordered.    Due for multiple vaccines and encouraged to get them. He declined all.         Relevant Orders    CBC and Differential    Comprehensive metabolic panel    Hemoglobin A1c    Lipid panel    PSA         Return in about 3 weeks (around 12/12/2023).    Deepa Vega MD

## 2023-11-21 NOTE — ASSESSMENT & PLAN NOTE
Routine labs ordered.  Multiple vascular risk factors:  - priority 1 is quitting smoking. Cessation counseling provided. He is thinking about it.  - home monitoring of BP and f/u in 3 weeks to review readings.    Check labs including cholesterol panel. May get calcium score, depending on results.  Could probably benefit from a stress in the future given risk factors and family history, but this is not urgent as he is not having anginal symptoms.    Colonoscopy UTD - obtain records.    PSA ordered.    Due for multiple vaccines and encouraged to get them. He declined all.

## 2024-01-27 LAB
ALBUMIN SERPL-MCNC: 4.6 G/DL (ref 3.9–4.9)
ALBUMIN/GLOB SERPL: 1.9 {RATIO} (ref 1.2–2.2)
ALP SERPL-CCNC: 64 IU/L (ref 44–121)
ALT SERPL-CCNC: 31 IU/L (ref 0–44)
AST SERPL-CCNC: 25 IU/L (ref 0–40)
BASOPHILS # BLD AUTO: 0.1 X10E3/UL (ref 0–0.2)
BASOPHILS NFR BLD AUTO: 1 %
BILIRUB SERPL-MCNC: 0.9 MG/DL (ref 0–1.2)
BUN SERPL-MCNC: 13 MG/DL (ref 8–27)
BUN/CREAT SERPL: 12 (ref 10–24)
CALCIUM SERPL-MCNC: 9.3 MG/DL (ref 8.6–10.2)
CHLORIDE SERPL-SCNC: 100 MMOL/L (ref 96–106)
CHOLEST SERPL-MCNC: 264 MG/DL (ref 100–199)
CO2 SERPL-SCNC: 24 MMOL/L (ref 20–29)
CREAT SERPL-MCNC: 1.11 MG/DL (ref 0.76–1.27)
EGFRCR SERPLBLD CKD-EPI 2021: 75 ML/MIN/1.73
EOSINOPHIL # BLD AUTO: 0.2 X10E3/UL (ref 0–0.4)
EOSINOPHIL NFR BLD AUTO: 2 %
ERYTHROCYTE [DISTWIDTH] IN BLOOD BY AUTOMATED COUNT: 13.1 % (ref 11.6–15.4)
GLOBULIN SER CALC-MCNC: 2.4 G/DL (ref 1.5–4.5)
GLUCOSE SERPL-MCNC: 118 MG/DL (ref 70–99)
HBA1C MFR BLD: 5.8 % (ref 4.8–5.6)
HCT VFR BLD AUTO: 42.3 % (ref 37.5–51)
HDLC SERPL-MCNC: 66 MG/DL
HGB BLD-MCNC: 14.9 G/DL (ref 13–17.7)
IMM GRANULOCYTES # BLD AUTO: 0 X10E3/UL (ref 0–0.1)
IMM GRANULOCYTES NFR BLD AUTO: 0 %
LDLC SERPL CALC-MCNC: 179 MG/DL (ref 0–99)
LYMPHOCYTES # BLD AUTO: 1.8 X10E3/UL (ref 0.7–3.1)
LYMPHOCYTES NFR BLD AUTO: 29 %
MCH RBC QN AUTO: 33.6 PG (ref 26.6–33)
MCHC RBC AUTO-ENTMCNC: 35.2 G/DL (ref 31.5–35.7)
MCV RBC AUTO: 95 FL (ref 79–97)
MONOCYTES # BLD AUTO: 0.7 X10E3/UL (ref 0.1–0.9)
MONOCYTES NFR BLD AUTO: 11 %
NEUTROPHILS # BLD AUTO: 3.5 X10E3/UL (ref 1.4–7)
NEUTROPHILS NFR BLD AUTO: 57 %
PLATELET # BLD AUTO: 208 X10E3/UL (ref 150–450)
POTASSIUM SERPL-SCNC: 4.4 MMOL/L (ref 3.5–5.2)
PROT SERPL-MCNC: 7 G/DL (ref 6–8.5)
PSA SERPL-MCNC: 1.6 NG/ML (ref 0–4)
RBC # BLD AUTO: 4.44 X10E6/UL (ref 4.14–5.8)
SODIUM SERPL-SCNC: 139 MMOL/L (ref 134–144)
TRIGL SERPL-MCNC: 111 MG/DL (ref 0–149)
VLDLC SERPL CALC-MCNC: 19 MG/DL (ref 5–40)
WBC # BLD AUTO: 6.2 X10E3/UL (ref 3.4–10.8)

## 2024-02-02 ENCOUNTER — OFFICE VISIT (OUTPATIENT)
Dept: FAMILY MEDICINE | Facility: CLINIC | Age: 64
End: 2024-02-02
Payer: COMMERCIAL

## 2024-02-02 VITALS
BODY MASS INDEX: 29.38 KG/M2 | DIASTOLIC BLOOD PRESSURE: 90 MMHG | SYSTOLIC BLOOD PRESSURE: 168 MMHG | OXYGEN SATURATION: 98 % | HEART RATE: 98 BPM | TEMPERATURE: 98.6 F | HEIGHT: 70 IN | WEIGHT: 205.2 LBS

## 2024-02-02 DIAGNOSIS — I10 WHITE COAT SYNDROME WITH DIAGNOSIS OF HYPERTENSION: ICD-10-CM

## 2024-02-02 DIAGNOSIS — I10 ESSENTIAL HYPERTENSION, BENIGN: Primary | ICD-10-CM

## 2024-02-02 DIAGNOSIS — E78.00 PURE HYPERCHOLESTEROLEMIA: ICD-10-CM

## 2024-02-02 DIAGNOSIS — R73.01 IMPAIRED FASTING GLUCOSE: ICD-10-CM

## 2024-02-02 PROCEDURE — 3080F DIAST BP >= 90 MM HG: CPT | Performed by: FAMILY MEDICINE

## 2024-02-02 PROCEDURE — 3008F BODY MASS INDEX DOCD: CPT | Performed by: FAMILY MEDICINE

## 2024-02-02 PROCEDURE — 99214 OFFICE O/P EST MOD 30 MIN: CPT | Performed by: FAMILY MEDICINE

## 2024-02-02 PROCEDURE — 3077F SYST BP >= 140 MM HG: CPT | Performed by: FAMILY MEDICINE

## 2024-02-02 ASSESSMENT — ENCOUNTER SYMPTOMS
FEVER: 0
LIGHT-HEADEDNESS: 0
COUGH: 0
PALPITATIONS: 0
CHILLS: 0
DIZZINESS: 0
SHORTNESS OF BREATH: 0
HEADACHES: 0

## 2024-02-02 ASSESSMENT — PATIENT HEALTH QUESTIONNAIRE - PHQ9: SUM OF ALL RESPONSES TO PHQ9 QUESTIONS 1 & 2: 0

## 2024-02-02 NOTE — ASSESSMENT & PLAN NOTE
Significant HL.  Given significant risk factors, will check calcium score.  Strongly encouraged patient to consider statin.  He would like trial of lifestyle intervention and checking calcium score.

## 2024-02-02 NOTE — PROGRESS NOTES
"Subjective    Jaskaran Simpson is a 63 y.o. male presenting today for: Follow-up    HPI: 62yo male with h/o HTN, HL, tobacco use here for BP check.     HTN: He did get a BP cuff at home, but doesn't remember recordings and forgot meter.    Previously able to lose 40 lbs through dietary changes previously. Small frequent meals.     Cigarettes: 1/2 PPD.      Patient Active Problem List   Diagnosis   • Essential hypertension, benign   • Family hx of colon cancer   • Pure hypercholesterolemia   • Chronic periscapular pain on left side   • Cervical radiculitis   • Routine health maintenance   • White coat syndrome with diagnosis of hypertension        No current outpatient medications on file.     No Known Allergies        Review of Systems   Constitutional: Negative for chills and fever.   Respiratory: Negative for cough and shortness of breath.    Cardiovascular: Negative for chest pain and palpitations.   Neurological: Negative for dizziness, light-headedness and headaches.        Objective  Visit Vitals  BP (!) 168/90 (BP Location: Left upper arm, Patient Position: Sitting)   Pulse 98   Temp 37 °C (98.6 °F) (Oral)   Ht 1.778 m (5' 10\")   Wt 93.1 kg (205 lb 3.2 oz)   SpO2 98%   BMI 29.44 kg/m²    Body mass index is 29.44 kg/m².     Physical Exam  Vitals reviewed.   Constitutional:       Appearance: Normal appearance. He is not ill-appearing.   HENT:      Head: Normocephalic and atraumatic.      Right Ear: External ear normal.      Left Ear: External ear normal.   Eyes:      Conjunctiva/sclera: Conjunctivae normal.      Pupils: Pupils are equal, round, and reactive to light.   Cardiovascular:      Rate and Rhythm: Normal rate and regular rhythm.      Heart sounds: No murmur heard.  Pulmonary:      Effort: Pulmonary effort is normal.      Breath sounds: Normal breath sounds. No wheezing, rhonchi or rales.   Skin:     General: Skin is warm and dry.   Neurological:      General: No focal deficit present.      Mental Status: " He is alert and oriented to person, place, and time.   Psychiatric:         Mood and Affect: Mood normal.         Behavior: Behavior normal.             Laboratories    No visits with results within 1 Month(s) from this visit.   Latest known visit with results is:   Office Visit on 11/21/2023   Component Date Value Ref Range Status   • WBC 01/26/2024 6.2  3.4 - 10.8 x10E3/uL Final   • RBC 01/26/2024 4.44  4.14 - 5.80 x10E6/uL Final   • Hemoglobin 01/26/2024 14.9  13.0 - 17.7 g/dL Final   • Hematocrit 01/26/2024 42.3  37.5 - 51.0 % Final   • MCV 01/26/2024 95  79 - 97 fL Final   • MCH 01/26/2024 33.6 (H)  26.6 - 33.0 pg Final   • MCHC 01/26/2024 35.2  31.5 - 35.7 g/dL Final   • RDW 01/26/2024 13.1  11.6 - 15.4 % Final   • Platelets 01/26/2024 208  150 - 450 x10E3/uL Final   • Neutrophils 01/26/2024 57  Not Estab. % Final   • Lymphs 01/26/2024 29  Not Estab. % Final   • Monocytes 01/26/2024 11  Not Estab. % Final   • Eos 01/26/2024 2  Not Estab. % Final   • Basos 01/26/2024 1  Not Estab. % Final   • Neutrophils (Absolute) 01/26/2024 3.5  1.4 - 7.0 x10E3/uL Final   • Lymphs (Absolute) 01/26/2024 1.8  0.7 - 3.1 x10E3/uL Final   • Monocytes(Absolute) 01/26/2024 0.7  0.1 - 0.9 x10E3/uL Final   • Eos (Absolute) 01/26/2024 0.2  0.0 - 0.4 x10E3/uL Final   • Baso (Absolute) 01/26/2024 0.1  0.0 - 0.2 x10E3/uL Final   • Immature Granulocytes 01/26/2024 0  Not Estab. % Final   • Immature Grans (Abs) 01/26/2024 0.0  0.0 - 0.1 x10E3/uL Final   • Glucose, Serum 01/26/2024 118 (H)  70 - 99 mg/dL Final   • BUN 01/26/2024 13  8 - 27 mg/dL Final   • Creatinine, Serum 01/26/2024 1.11  0.76 - 1.27 mg/dL Final   • eGFR 01/26/2024 75  >59 mL/min/1.73 Final   • BUN/Creatinine Ratio 01/26/2024 12  10 - 24 Final   • Sodium, Serum 01/26/2024 139  134 - 144 mmol/L Final   • Potassium, Serum 01/26/2024 4.4  3.5 - 5.2 mmol/L Final   • Chloride, Serum 01/26/2024 100  96 - 106 mmol/L Final   • Carbon Dioxide, Total 01/26/2024 24  20 - 29 mmol/L  Final   • Calcium, Serum 01/26/2024 9.3  8.6 - 10.2 mg/dL Final   • Protein, Total, Serum 01/26/2024 7.0  6.0 - 8.5 g/dL Final   • Albumin, Serum 01/26/2024 4.6  3.9 - 4.9 g/dL Final   • Globulin, Total 01/26/2024 2.4  1.5 - 4.5 g/dL Final   • A/G Ratio 01/26/2024 1.9  1.2 - 2.2 Final   • Bilirubin, Total 01/26/2024 0.9  0.0 - 1.2 mg/dL Final   • Alkaline Phosphatase, S 01/26/2024 64  44 - 121 IU/L Final   • AST (SGOT) 01/26/2024 25  0 - 40 IU/L Final   • ALT (SGPT) 01/26/2024 31  0 - 44 IU/L Final   • Hemoglobin A1c 01/26/2024 5.8 (H)  4.8 - 5.6 % Final    Comment:          Prediabetes: 5.7 - 6.4           Diabetes: >6.4           Glycemic control for adults with diabetes: <7.0     • Cholesterol, Total 01/26/2024 264 (H)  100 - 199 mg/dL Final   • Triglycerides 01/26/2024 111  0 - 149 mg/dL Final   • HDL Cholesterol 01/26/2024 66  >39 mg/dL Final   • VLDL Cholesterol [place] 01/26/2024 19  5 - 40 mg/dL Final   • LDL Cholesterol Calc 01/26/2024 179 (H)  0 - 99 mg/dL Final   • Prostate Specific Ag, Serum 01/26/2024 1.6  0.0 - 4.0 ng/mL Final    Comment: Roche ECLIA methodology.  According to the American Urological Association, Serum PSA should  decrease and remain at undetectable levels after radical  prostatectomy. The AUA defines biochemical recurrence as an initial  PSA value 0.2 ng/mL or greater followed by a subsequent confirmatory  PSA value 0.2 ng/mL or greater.  Values obtained with different assay methods or kits cannot be used  interchangeably. Results cannot be interpreted as absolute evidence  of the presence or absence of malignant disease.           Imaging          Assessment/Plan  Problem List Items Addressed This Visit        Circulatory    Essential hypertension, benign - Primary     He will send me home numbers when he gets home.         White coat syndrome with diagnosis of hypertension     Long standing history. Contributing to elevated BP in office.  Will follow home Bps.            Other    Pure  hypercholesterolemia     Significant HL.  Given significant risk factors, will check calcium score.  Strongly encouraged patient to consider statin.  He would like trial of lifestyle intervention and checking calcium score.         Relevant Orders    Comprehensive metabolic panel    Lipid panel    CT HEART CORONARY ARTERY CALCIUM SCORE WITHOUT IV CONTRAST   Other Visit Diagnoses     Impaired fasting glucose        Relevant Orders    Hemoglobin A1c         Discussed my concerns with his risks for CV disease.     Return in about 3 months (around 5/2/2024).     Deepa Vega MD

## 2024-02-16 ENCOUNTER — HOSPITAL ENCOUNTER (OUTPATIENT)
Dept: RADIOLOGY | Age: 64
Discharge: HOME | End: 2024-02-16
Attending: FAMILY MEDICINE

## 2024-02-16 DIAGNOSIS — E78.00 PURE HYPERCHOLESTEROLEMIA: ICD-10-CM

## 2024-02-16 PROCEDURE — 75571 CT HRT W/O DYE W/CA TEST: CPT | Mod: MG

## 2024-02-19 ENCOUNTER — TELEPHONE (OUTPATIENT)
Dept: FAMILY MEDICINE | Facility: CLINIC | Age: 64
End: 2024-02-19
Payer: COMMERCIAL

## 2024-02-19 DIAGNOSIS — I25.10 CORONARY ARTERY DISEASE INVOLVING NATIVE CORONARY ARTERY OF NATIVE HEART WITHOUT ANGINA PECTORIS: ICD-10-CM

## 2024-02-19 DIAGNOSIS — E78.00 PURE HYPERCHOLESTEROLEMIA: Primary | ICD-10-CM

## 2024-02-19 RX ORDER — ASPIRIN 81 MG/1
81 TABLET ORAL DAILY
COMMUNITY

## 2024-02-19 RX ORDER — ROSUVASTATIN CALCIUM 20 MG/1
20 TABLET, COATED ORAL DAILY
Qty: 30 TABLET | Refills: 2 | Status: SHIPPED | OUTPATIENT
Start: 2024-02-19 | End: 2024-05-08 | Stop reason: SDUPTHER

## 2024-02-19 NOTE — TELEPHONE ENCOUNTER
Pt is asking for call back (says he missed your call) after 3:30pm- says he is usually at work and cannot answer before then.

## 2024-02-19 NOTE — PROGRESS NOTES
"Spoke with patient about calcium score.  Starting high dose statin and aspirin 81mg.  Urgent cardiology referral.  He bought his \"last pack of cigarettes\" a couple days ago.   He has cleaned up his diet immensely.  He reports losing 15 lbs and will send me BP numbers from home to see if we need to start anti hypertensives.  "

## 2024-02-26 PROBLEM — I77.810 ASCENDING AORTA DILATATION (CMS/HCC): Status: ACTIVE | Noted: 2024-02-26

## 2024-02-26 PROBLEM — R93.1 AGATSTON CORONARY ARTERY CALCIUM SCORE GREATER THAN 400: Status: ACTIVE | Noted: 2024-02-26

## 2024-02-26 NOTE — PROGRESS NOTES
Cardiology Consult/New Patient    Reason for visit: Elevated CT calcium score.    ROSALINDA Jessica presents today for evaluation.  He is a 63-year-old male with history of smoking, approximate 1/2 pack/day for the last 7 years but prior to that had smoked for approximately 10 years and had stopped for 20 years, hyperlipidemia who recently underwent CT coronary calcium scoring which was markedly elevated at 1841.  There was a score in the left main up to 74, LAD score 709, left circumflex 260 and RCA score 598.  His ascending aorta was also mildly dilated at 4 cm.    Clinically, he has not had any chest discomfort or shortness of breath.  He does a lot of lifting at work without any difficulty.  There has not been any dizziness, near-syncope or syncope.  No lower extremity edema.    Jaskaran  tells me that since he found out his score he stopped drinking alcohol, he had been drinking 3-4 beers per day, cut back on the [place] cut back on the cheese steaks and hamburgers and has lost 18 pounds.    On exam today in the office his blood pressure was 130/80 in both arms.    His EKG revealed normal sinus rhythm and was normal.    Medical History:   Past Medical History:   Diagnosis Date   • Agatston coronary artery calcium score greater than 400 2/26/2024   • Ascending aorta dilatation (CMS/HCC) 2/26/2024   • Hypertension    • Mixed hyperlipidemia 12/29/2009       Surgical History:   Past Surgical History:   Procedure Laterality Date   • KNEE SURGERY         Family History:   Family History   Problem Relation Age of Onset   • Heart attack Biological Mother         passed away when she was 77   • Hypertension Biological Mother    • Hyperlipidemia Biological Mother    • Colon cancer Biological Father    • Heart attack Biological Father    • Prostate cancer Neg Hx         Social History:    Social History     Tobacco Use   • Smoking status: Former     Packs/day: 0.50     Years: 20.00     Additional pack years: 0.00     Total pack  years: 10.00     Types: Cigarettes     Quit date: 2024     Years since quittin.0   • Smokeless tobacco: Never   Substance Use Topics   • Alcohol use: Not Currently     Comment: 2 drinks per day- stopped drinking 2/3/2024   • Drug use: Not Currently        Allergies: Patient has no known allergies.    Current Outpatient Medications   Medication Instructions   • aspirin 81 mg, oral, Daily   • rosuvastatin (CRESTOR) 20 mg, oral, Daily        Review of Systems  Review of Systems   Cardiovascular: Negative for chest pain, dyspnea on exertion, irregular heartbeat, leg swelling, near-syncope, orthopnea, palpitations, paroxysmal nocturnal dyspnea and syncope.   Neurological: Negative for dizziness and light-headedness.       Objective     Vitals:    24 1211   BP: 130/80 in both arms   Pulse: 81   SpO2: 98%       Physical Exam  Constitutional:       Appearance: He is well-developed.   Neck:      Vascular: No carotid bruit or JVD.   Cardiovascular:      Rate and Rhythm: Normal rate and regular rhythm.      Pulses:           Carotid pulses are 2+ on the right side and 2+ on the left side.       Dorsalis pedis pulses are 2+ on the right side and 2+ on the left side.        Posterior tibial pulses are 2+ on the right side and 2+ on the left side.      Heart sounds: S1 normal and S2 normal. No murmur heard.     No friction rub. No gallop.   Pulmonary:      Effort: Pulmonary effort is normal.      Breath sounds: Normal breath sounds.   Musculoskeletal:      Right lower leg: No edema.      Left lower leg: No edema.   Skin:     General: Skin is warm and dry.   Neurological:      Mental Status: He is alert.   Psychiatric:         Behavior: Behavior normal.            CT coronary calcium score 2024.  2) CARDIAC FINDINGS:1   CORONARY CALCIUM COMPOSITE AGATSTON SCORE: 1841     Left Main: 274  LAD: 709  LCX: 260  RCA: 598     3) NON-CARDIAC FINDINGS  AORTA: Dilated ascending aorta measuring 4.0 cm.         ECG.   Normal sinus rhythm.  Normal EKG.      Assessment/Plan     Agatston coronary artery calcium score greater than 400  I have reviewed the findings with Jaskaran I explained the significance and increased risk of myocardial infarction over the next 10 years.  I drew him a picture and showed which arteries had a plaque score in each.    I recommend that he undergo exercise nuclear stress testing to look for any evidence of ischemia and flow-limiting lesions.    He will continue on the aspirin and the rosuvastatin.    I have counseled him on the need for heart healthy living including abstaining from smoking and sticking with his current diet.  I have given him information about the Mediterranean diet and encouraged him to begin a exercise program such as walking.    Ascending aorta dilatation (CMS/HCC)  I will have Jaskaran get an echocardiogram to assess his aorta and also to look for the possibility of a bicuspid aortic valve given his aortic dilatation.    I discussed with him ways to reduce the risk of aortic growth including abstinence from smoking, avoiding heavy lifting and maintaining a blood pressure of less than 130/80.    Mixed hyperlipidemia  Lipid profile done in January revealed total cholesterol 264, triglycerides 111, HDL 66 and LDL was 179.    He will continue on the rosuvastatin 20 mg daily.  He is scheduled to get a follow-up lipid profile.    I have told Jaskaran that our goal is an LDL of less than 70 given his significant atherosclerosis.    Counseled to continue need for following a low-fat low-cholesterol Mediterranean diet, abstinence from smoking and exercise regimen.    We had a prolonged discussion about these complex clinical issues and went over the various important aspects to consider. All questions were answered.    Jaskaran will follow-up with me after the tests are performed.  I will keep you informed the results as well as his progress.  He will call with any questions or concerns in the  interim.    If you have any questions or if I can be of any further assistance, please do not hesitate to contact me.    I spent 45 minutes on this date of service performing the following activities: obtaining history, performing examination, entering orders, documenting, preparing for visit, obtaining / reviewing records, providing counseling and education and communicating results.         Nestor Can MD  2/27/2024

## 2024-02-26 NOTE — PATIENT INSTRUCTIONS
Patient Education   Mediterranean Diet  A Mediterranean diet refers to food and lifestyle choices that are based on the traditions of countries located on the [place]. It focuses on eating more fruits, vegetables, whole grains, beans, nuts, seeds, and heart-healthy fats, and eating less dairy, meat, eggs, and processed foods with added sugar, salt, and fat. This way of eating has been shown to help prevent certain conditions and improve outcomes for people who have chronic diseases, like kidney disease and heart disease.  What are tips for following this plan?  Reading food labels  Check the serving size of packaged foods. For foods such as rice and pasta, the serving size refers to the amount of cooked product, not dry.  Check the total fat in packaged foods. Avoid foods that have saturated fat or trans fats.  Check the ingredient list for added sugars, such as corn syrup.  Shopping    Buy a variety of foods that offer a balanced diet, including:  Fresh fruits and vegetables (produce).  Grains, beans, nuts, and seeds. Some of these may be available in unpackaged forms or large amounts (in bulk).  Fresh seafood.  Poultry and eggs.  Low-fat dairy products.  Buy whole ingredients instead of prepackaged foods.  Buy fresh fruits and vegetables in-season from local [employer] markets.  Buy plain frozen fruits and vegetables.  If you do not have access to quality fresh seafood, buy precooked frozen shrimp or canned fish, such as tuna, salmon, or sardines.  Stock your pantry so you always have certain foods on hand, such as olive oil, canned tuna, canned tomatoes, rice, pasta, and beans.  Cooking  Cook foods with extra-virgin olive oil instead of using butter or other vegetable oils.  Have meat as a side dish, and have vegetables or grains as your main dish. This means having meat in small portions or adding small amounts of meat to foods like pasta or stew.  Use beans or vegetables instead of meat in common dishes  like chili or lasagna.  [place] with different cooking methods. Try roasting, broiling, steaming, and sautéing vegetables.  Add frozen vegetables to soups, stews, pasta, or rice.  Add nuts or seeds for added healthy fats and plant protein at each meal. You can add these to yogurt, salads, or vegetable dishes.  Marinate fish or vegetables using olive oil, lemon juice, garlic, and fresh herbs.  Meal planning  Plan to eat one vegetarian meal one day each week. Try to work up to two vegetarian meals, if possible.  Eat seafood two or more times a week.  Have healthy snacks readily available, such as:  Vegetable sticks with hummus.  Greek yogurt.  Fruit and nut trail mix.  Eat balanced meals throughout the week. This includes:  Fruit: 2-3 servings a day.  Vegetables: 4-5 servings a day.  Low-fat dairy: 2 servings a day.  Fish, poultry, or lean meat: 1 serving a day.  Beans and legumes: 2 or more servings a week.  Nuts and seeds: 1-2 servings a day.  Whole grains: 6-8 servings a day.  Extra-virgin olive oil: 3-4 servings a day.  Limit red meat and sweets to only a few servings a month.  Lifestyle    Cook and eat meals together with your family, when possible.  Drink enough fluid to keep your urine pale yellow.  Be physically active every day. This includes:  Aerobic exercise like running or swimming.  Leisure activities like gardening, walking, or housework.  Get 7-8 hours of sleep each night.  If recommended by your health care provider, drink red wine in moderation. This means 1 glass a day for nonpregnant women and 2 glasses a day for men. A glass of wine equals 5 oz (150 mL).  What foods should I eat?  Fruits  Apples. Apricots. Avocado. Berries. Bananas. Cherries. Dates. Figs. Grapes. Polly. Melon. Oranges. Peaches. Plums. Pomegranate.  Vegetables  Artichokes. Beets. Broccoli. Cabbage. Carrots. Eggplant. Green beans. Chard. Kale. Spinach. Onions. Leeks. Peas. Squash. Tomatoes. Peppers.  Radishes.  Grains  Whole-grain pasta. Brown rice. Bulgur wheat. Polenta. Couscous. Whole-wheat bread. Oatmeal. Quinoa.  Meats and other proteins  Beans. Almonds. Sunflower seeds. Pine nuts. Peanuts. Cod. [place]. Scallops. Shrimp. Tuna. Tilapia. Clams. Oysters. Eggs. Poultry without skin.  Dairy  Low-fat milk. Cheese. Greek yogurt.  Fats and oils  Extra-virgin olive oil. Avocado oil. Grapeseed oil.  Beverages  Water. Red wine. Herbal tea.  Sweets and desserts  Greek yogurt with honey. Baked apples. Poached pears. Trail mix.  Seasonings and condiments  Basil. Cilantro. Coriander. Cumin. Mint. Parsley. Andrey. Rosemary. Tarragon. Garlic. Oregano. Thyme. Pepper. Balsamic vinegar. Tahini. Hummus. Tomato sauce. Olives. Mushrooms.  The items listed above may not be a complete list of foods and beverages you can eat. Contact a dietitian for more information.  What foods should I limit?  This is a list of foods that should be eaten rarely or only on special occasions.  Fruits  Fruit canned in syrup.  Vegetables  Deep-fried potatoes (french fries).  Grains  Prepackaged pasta or rice dishes. Prepackaged cereal with added sugar. Prepackaged snacks with added sugar.  Meats and other proteins  Beef. Pork. Lamb. Poultry with skin. Hot dogs. Crump.  Dairy  Ice cream. Sour cream. Whole milk.  Fats and oils  Butter. Canola oil. Vegetable oil. Beef fat (tallow). Lard.  Beverages  Juice. Sugar-sweetened soft drinks. Beer. Liquor and spirits.  Sweets and desserts  Cookies. Cakes. Pies. Candy.  Seasonings and condiments  Mayonnaise. Pre-made sauces and marinades.  The items listed above may not be a complete list of foods and beverages you should limit. Contact a dietitian for more information.  Summary  The Mediterranean diet includes both food and lifestyle choices.  Eat a variety of fresh fruits and vegetables, beans, nuts, seeds, and whole grains.  Limit the amount of red meat and sweets that you eat.  If recommended by your health  care provider, drink red wine in moderation. This means 1 glass a day for nonpregnant women and 2 glasses a day for men. A glass of wine equals 5 oz (150 mL).  This information is not intended to replace advice given to you by your health care provider. Make sure you discuss any questions you have with your health care provider.  Document Revised: 01/22/2021 Document Reviewed: 11/19/2020  Elsevier Patient Education © 2023 Elsevier Inc.

## 2024-02-27 ENCOUNTER — OFFICE VISIT (OUTPATIENT)
Dept: CARDIOLOGY | Facility: CLINIC | Age: 64
End: 2024-02-27
Payer: COMMERCIAL

## 2024-02-27 VITALS
BODY MASS INDEX: 26.77 KG/M2 | DIASTOLIC BLOOD PRESSURE: 80 MMHG | SYSTOLIC BLOOD PRESSURE: 130 MMHG | WEIGHT: 187 LBS | HEART RATE: 81 BPM | OXYGEN SATURATION: 98 % | HEIGHT: 70 IN

## 2024-02-27 DIAGNOSIS — I10 ESSENTIAL HYPERTENSION, BENIGN: Primary | ICD-10-CM

## 2024-02-27 DIAGNOSIS — R06.02 SHORTNESS OF BREATH: ICD-10-CM

## 2024-02-27 DIAGNOSIS — I77.810 ASCENDING AORTA DILATATION (CMS/HCC): ICD-10-CM

## 2024-02-27 DIAGNOSIS — R93.1 AGATSTON CORONARY ARTERY CALCIUM SCORE GREATER THAN 400: ICD-10-CM

## 2024-02-27 DIAGNOSIS — E78.2 MIXED HYPERLIPIDEMIA: ICD-10-CM

## 2024-02-27 LAB
ATRIAL RATE: 60
P AXIS: 66
PR INTERVAL: 176
QRS DURATION: 86
QT INTERVAL: 432
QTC CALCULATION(BAZETT): 432
R AXIS: -13
T WAVE AXIS: 45
VENTRICULAR RATE: 60

## 2024-02-27 PROCEDURE — 99204 OFFICE O/P NEW MOD 45 MIN: CPT | Performed by: INTERNAL MEDICINE

## 2024-02-27 PROCEDURE — 93000 ELECTROCARDIOGRAM COMPLETE: CPT | Performed by: INTERNAL MEDICINE

## 2024-02-27 PROCEDURE — 3008F BODY MASS INDEX DOCD: CPT | Performed by: INTERNAL MEDICINE

## 2024-02-27 ASSESSMENT — ENCOUNTER SYMPTOMS
IRREGULAR HEARTBEAT: 0
PND: 0
DYSPNEA ON EXERTION: 0
SYNCOPE: 0
ORTHOPNEA: 0
NEAR-SYNCOPE: 0
PALPITATIONS: 0
LIGHT-HEADEDNESS: 0
DIZZINESS: 0

## 2024-02-27 NOTE — ASSESSMENT & PLAN NOTE
I will have Jaskaran get an echocardiogram to assess his aorta and also to look for the possibility of a bicuspid aortic valve given his aortic dilatation.    I discussed with him ways to reduce the risk of aortic growth including abstinence from smoking, avoiding heavy lifting and maintaining a blood pressure of less than 130/80.

## 2024-02-27 NOTE — ASSESSMENT & PLAN NOTE
Lipid profile done in January revealed total cholesterol 264, triglycerides 111, HDL 66 and LDL was 179.    He will continue on the rosuvastatin 20 mg daily.  He is scheduled to get a follow-up lipid profile.    I have told Jaskaran that our goal is an LDL of less than 70 given his significant atherosclerosis.    Counseled to continue need for following a low-fat low-cholesterol Mediterranean diet, abstinence from smoking and exercise regimen.

## 2024-02-27 NOTE — ASSESSMENT & PLAN NOTE
I have reviewed the findings with Jaskaran I explained the significance and increased risk of myocardial infarction over the next 10 years.  I drew him a picture and showed which arteries had a plaque score in each.    I recommend that he undergo exercise nuclear stress testing to look for any evidence of ischemia and flow-limiting lesions.    He will continue on the aspirin and the rosuvastatin.    I have counseled him on the need for heart healthy living including abstaining from smoking and sticking with his current diet.  I have given him information about the Mediterranean diet and encouraged him to begin a exercise program such as walking.

## 2024-02-27 NOTE — LETTER
February 27, 2024     Deepa Vega MD  [address] [address]    Patient: Jaskaran Simpson  YOB: 1960  Date of Visit: 2/27/2024      Dear Dr. Vega:    Thank you for referring Jaskaran Simpson to me for evaluation. Below are my notes for this consultation.    If you have questions, please do not hesitate to call me. I look forward to following your patient along with you.         Sincerely,        Nestor Can MD        CC: No Recipients    Nestor Can MD  2/27/2024 12:57 PM  Signed  Cardiology Consult/New Patient    Reason for visit: Elevated CT calcium score.    ROSALINDA Jessica presents today for evaluation.  He is a 63-year-old male with history of smoking, approximate 1/2 pack/day for the last 7 years but prior to that had smoked for approximately 10 years and had stopped for 20 years, hyperlipidemia who recently underwent CT coronary calcium scoring which was markedly elevated at 1841.  There was a score in the left main up to 74, LAD score 709, left circumflex 260 and RCA score 598.  His ascending aorta was also mildly dilated at 4 cm.    Clinically, he has not had any chest discomfort or shortness of breath.  He does a lot of lifting at work without any difficulty.  There has not been any dizziness, near-syncope or syncope.  No lower extremity edema.    Jaskaran  tells me that since he found out his score he stopped drinking alcohol, he had been drinking 3-4 beers per day, cut back on the [place] cut back on the cheese steaks and hamburgers and has lost 18 pounds.    On exam today in the office his blood pressure was 130/80 in both arms.    His EKG revealed normal sinus rhythm and was normal.    Medical History:   Past Medical History:   Diagnosis Date   • Agatston coronary artery calcium score greater than 400 2/26/2024   • Ascending aorta dilatation (CMS/HCC) 2/26/2024   • Hypertension    • Mixed hyperlipidemia 12/29/2009       Surgical History:    Past Surgical History:   Procedure Laterality Date   • KNEE SURGERY         Family History:   Family History   Problem Relation Age of Onset   • Heart attack Biological Mother         passed away when she was 77   • Hypertension Biological Mother    • Hyperlipidemia Biological Mother    • Colon cancer Biological Father    • Heart attack Biological Father    • Prostate cancer Neg Hx         Social History:    Social History     Tobacco Use   • Smoking status: Former     Packs/day: 0.50     Years: 20.00     Additional pack years: 0.00     Total pack years: 10.00     Types: Cigarettes     Quit date: 2024     Years since quittin.0   • Smokeless tobacco: Never   Substance Use Topics   • Alcohol use: Not Currently     Comment: 2 drinks per day- stopped drinking 2/3/2024   • Drug use: Not Currently        Allergies: Patient has no known allergies.    Current Outpatient Medications   Medication Instructions   • aspirin 81 mg, oral, Daily   • rosuvastatin (CRESTOR) 20 mg, oral, Daily        Review of Systems  Review of Systems   Cardiovascular: Negative for chest pain, dyspnea on exertion, irregular heartbeat, leg swelling, near-syncope, orthopnea, palpitations, paroxysmal nocturnal dyspnea and syncope.   Neurological: Negative for dizziness and light-headedness.       Objective     Vitals:    24 1211   BP: 130/80 in both arms   Pulse: 81   SpO2: 98%       Physical Exam  Constitutional:       Appearance: He is well-developed.   Neck:      Vascular: No carotid bruit or JVD.   Cardiovascular:      Rate and Rhythm: Normal rate and regular rhythm.      Pulses:           Carotid pulses are 2+ on the right side and 2+ on the left side.       Dorsalis pedis pulses are 2+ on the right side and 2+ on the left side.        Posterior tibial pulses are 2+ on the right side and 2+ on the left side.      Heart sounds: S1 normal and S2 normal. No murmur heard.     No friction rub. No gallop.   Pulmonary:      Effort:  Pulmonary effort is normal.      Breath sounds: Normal breath sounds.   Musculoskeletal:      Right lower leg: No edema.      Left lower leg: No edema.   Skin:     General: Skin is warm and dry.   Neurological:      Mental Status: He is alert.   Psychiatric:         Behavior: Behavior normal.            CT coronary calcium score 2/16/2024.  2) CARDIAC FINDINGS:1   CORONARY CALCIUM COMPOSITE AGATSTON SCORE: 1841     Left Main: 274  LAD: 709  LCX: 260  RCA: 598     3) NON-CARDIAC FINDINGS  AORTA: Dilated ascending aorta measuring 4.0 cm.         ECG.  Normal sinus rhythm.  Normal EKG.      Assessment/Plan     Agatston coronary artery calcium score greater than 400  I have reviewed the findings with Jaskaran I explained the significance and increased risk of myocardial infarction over the next 10 years.  I drew him a picture and showed which arteries had a plaque score in each.    I recommend that he undergo exercise nuclear stress testing to look for any evidence of ischemia and flow-limiting lesions.    He will continue on the aspirin and the rosuvastatin.    I have counseled him on the need for heart healthy living including abstaining from smoking and sticking with his current diet.  I have given him information about the Mediterranean diet and encouraged him to begin a exercise program such as walking.    Ascending aorta dilatation (CMS/HCC)  I will have Jaskaran get an echocardiogram to assess his aorta and also to look for the possibility of a bicuspid aortic valve given his aortic dilatation.    I discussed with him ways to reduce the risk of aortic growth including abstinence from smoking, avoiding heavy lifting and maintaining a blood pressure of less than 130/80.    Mixed hyperlipidemia  Lipid profile done in January revealed total cholesterol 264, triglycerides 111, HDL 66 and LDL was 179.    He will continue on the rosuvastatin 20 mg daily.  He is scheduled to get a follow-up lipid profile.    I have told  Jaskaran that our goal is an LDL of less than 70 given his significant atherosclerosis.    Counseled to continue need for following a low-fat low-cholesterol Mediterranean diet, abstinence from smoking and exercise regimen.    We had a prolonged discussion about these complex clinical issues and went over the various important aspects to consider. All questions were answered.    Jaskaran will follow-up with me after the tests are performed.  I will keep you informed the results as well as his progress.  He will call with any questions or concerns in the interim.    If you have any questions or if I can be of any further assistance, please do not hesitate to contact me.    I spent 45 minutes on this date of service performing the following activities: obtaining history, performing examination, entering orders, documenting, preparing for visit, obtaining / reviewing records, providing counseling and education and communicating results.        Nestor Can MD  2/27/2024

## 2024-03-12 ENCOUNTER — HOSPITAL ENCOUNTER (OUTPATIENT)
Dept: CARDIOLOGY | Facility: CLINIC | Age: 64
Setting detail: NUCLEAR MEDICINE
Discharge: HOME | End: 2024-03-12
Attending: INTERNAL MEDICINE
Payer: COMMERCIAL

## 2024-03-12 ENCOUNTER — APPOINTMENT (OUTPATIENT)
Dept: CARDIOLOGY | Facility: CLINIC | Age: 64
End: 2024-03-12
Attending: INTERNAL MEDICINE
Payer: COMMERCIAL

## 2024-03-12 ENCOUNTER — APPOINTMENT (OUTPATIENT)
Dept: CARDIOLOGY | Facility: CLINIC | Age: 64
Setting detail: NUCLEAR MEDICINE
End: 2024-03-12
Attending: INTERNAL MEDICINE
Payer: COMMERCIAL

## 2024-03-12 VITALS
BODY MASS INDEX: 26.77 KG/M2 | DIASTOLIC BLOOD PRESSURE: 80 MMHG | WEIGHT: 187 LBS | HEIGHT: 70 IN | SYSTOLIC BLOOD PRESSURE: 130 MMHG

## 2024-03-12 DIAGNOSIS — R06.02 SHORTNESS OF BREATH: ICD-10-CM

## 2024-03-12 DIAGNOSIS — R93.1 AGATSTON CORONARY ARTERY CALCIUM SCORE GREATER THAN 400: ICD-10-CM

## 2024-03-12 DIAGNOSIS — I77.810 ASCENDING AORTA DILATATION (CMS/HCC): ICD-10-CM

## 2024-03-12 LAB
AORTIC ROOT ANNULUS - M-MODE: 3.6 CM
AORTIC ROOT ANNULUS: 4 CM
ASCENDING AORTA: 4.3 CM
AV PEAK GRADIENT: 6 MMHG
AV PEAK VELOCITY-S: 1.26 M/S
AV VALVE AREA: 2.33 CM2
BSA FOR ECHO PROCEDURE: 2.05 M2
CUSP SEPARATION: 1.5 CM
CV NM TETROFOSMIN REST DOSE: 9.8 MCI
CV NM TETROFOSMIN STRESS DOSE: 28.3 MCI
DOP CALC LVOT STROKE VOLUME: 68.93 CM3
E WAVE DECELERATION TIME: 215 MS
E/A RATIO: 0.7
E/E' RATIO: 6.5
E/LAT E' RATIO: 5.1
EDV (BP): 69.5 CM3
EF (A4C): 77.3 %
EF A2C: 55.9 %
EJECTION FRACTION: 70.1 %
EST RIGHT VENT SYSTOLIC PRESSURE BY TRICUSPID REGURGITATION JET: 25 MMHG
ESV (BP): 20.8 CM3
FRACTIONAL SHORTENING: 40.2 %
INTERVENTRICULAR SEPTUM: 1.02 CM
IVC-EXP: 0.9 CM
IVC-INS: 0.69 CM
LA ESV INDEX (A2C): 22.83 CM3/M2
LA/AORTA RATIO: 0.83
LAAS-AP2: 16.7 CM2
LAAS-AP4: 13.9 CM2
LAD 2D - M-MODE: 3 CM
LAD 2D: 3 CM
LALD A4C: 4.82 CM
LALD A4C: 6.07 CM
LAV-S: 46.8 CM3
LEFT ATRIUM VOLUME INDEX: 12.15 CM3/M2
LEFT ATRIUM VOLUME: 24.9 CM3
LEFT INTERNAL DIMENSION IN SYSTOLE: 2.35 CM (ref 3–4.54)
LEFT VENTRICLE DIASTOLIC VOLUME INDEX: 41.02 CM3/M2
LEFT VENTRICLE DIASTOLIC VOLUME: 84.1 CM3
LEFT VENTRICLE SYSTOLIC VOLUME INDEX: 9.27 CM3/M2
LEFT VENTRICLE SYSTOLIC VOLUME: 19 CM3
LEFT VENTRICULAR INTERNAL DIMENSION IN DIASTOLE: 3.93 CM (ref 5.11–7.09)
LEFT VENTRICULAR POSTERIOR WALL IN END DIASTOLE: 1.13 CM (ref 0.66–1.23)
LV DIASTOLIC VOLUME: 51.2 CM3
LV ESV (APICAL 2 CHAMBER): 22.6 CM3
LVAD-AP2: 22.4 CM2
LVAD-AP4: 29.9 CM2
LVAS-AP2: 13.7 CM2
LVAS-AP4: 11.2 CM2
LVEDVI(A2C): 24.98 CM3/M2
LVEDVI(BP): 33.9 CM3/M2
LVESVI(A2C): 11.02 CM3/M2
LVESVI(BP): 10.15 CM3/M2
LVLD-AP2: 8.31 CM
LVLD-AP4: 9.35 CM
LVLS-AP2: 7.25 CM
LVLS-AP4: 6.91 CM
LVOT 2D: 2.3 CM
LVOT A: 4.15 CM2
LVOT MG: 2 MMHG
LVOT MV: 0.61 M/S
LVOT PEAK VELOCITY: 0.9 M/S
LVOT PG: 3 MMHG
LVOT STROKE VOLUME INDEX: 33.63 ML/M2
LVOT VTI: 16.6 CM
MLH CV ECHO AVA INDEX VELOCITY RATIO: 1.1
MLH CV NM REST ADMIN DATE: NORMAL MM/DD/YYYY
MLH CV NM REST ADMIN TIME: NORMAL HR:MIN
MLH CV NM STRESS ADMIN DATE: NORMAL MM/DD/YYYY
MLH CV NM STRESS ADMIN TIME: NORMAL HR:MIN
MV E'TISSUE VEL-LAT: 0.11 M/S
MV E'TISSUE VEL-MED: 0.09 M/S
MV PEAK A VEL: 0.84 M/S
MV PEAK E VEL: 0.58 M/S
NUC STRESS EJECTION FRACTION: 59 %
POSTERIOR WALL: 1.13 CM
PV PEAK GRADIENT: 3 MMHG
PV PV: 0.86 M/S
RAP: 3 MMHG
RVOT VMAX: 0.67 M/S
RVOT VTI: 11.7 CM
SEPTAL TISSUE DOPPLER FREE WALL LATE DIA VELOCITY (APICAL 4 CHAMBER VIEW): 0.12 M/S
STRESS BASELINE BP: NORMAL MMHG
STRESS BASELINE HR: 65 BPM
STRESS PERCENT HR: 91 %
STRESS POST ESTIMATED WORKLOAD: 9.6 METS
STRESS POST EXERCISE DUR MIN: 6 MIN
STRESS POST EXERCISE DUR SEC: 51 SEC
STRESS POST PEAK BP: NORMAL MMHG
STRESS POST PEAK HR: 144 BPM
STRESS TARGET HR: 134 BPM
TR MAX PG: 21.53 MMHG
TRICUSPID VALVE PEAK REGURGITATION VELOCITY: 2.32 M/S
Z-SCORE OF LEFT VENTRICULAR DIMENSION IN END DIASTOLE: -4.26
Z-SCORE OF LEFT VENTRICULAR DIMENSION IN END SYSTOLE: -3.56
Z-SCORE OF LEFT VENTRICULAR POSTERIOR WALL IN END DIASTOLE: 1.21

## 2024-03-12 PROCEDURE — A9502 TC99M TETROFOSMIN: HCPCS | Performed by: INTERNAL MEDICINE

## 2024-03-12 PROCEDURE — 78452 HT MUSCLE IMAGE SPECT MULT: CPT | Performed by: INTERNAL MEDICINE

## 2024-03-12 PROCEDURE — 93015 CV STRESS TEST SUPVJ I&R: CPT | Performed by: INTERNAL MEDICINE

## 2024-03-12 PROCEDURE — 93306 TTE W/DOPPLER COMPLETE: CPT | Performed by: INTERNAL MEDICINE

## 2024-03-14 ENCOUNTER — TELEPHONE (OUTPATIENT)
Dept: SCHEDULING | Facility: CLINIC | Age: 64
End: 2024-03-14
Payer: COMMERCIAL

## 2024-03-14 NOTE — TELEPHONE ENCOUNTER
Pt called said he missed a call from Dr. Can to go over test results.     Pt req. A call back after 3pm since he is at work and not able to speak before that time     P: [phone]

## 2024-03-15 ENCOUNTER — TELEPHONE (OUTPATIENT)
Dept: CARDIOLOGY | Facility: CLINIC | Age: 64
End: 2024-03-15
Payer: COMMERCIAL

## 2024-03-15 DIAGNOSIS — R94.39 ABNORMAL NUCLEAR STRESS TEST: Primary | ICD-10-CM

## 2024-03-15 DIAGNOSIS — Z01.810 PREOPERATIVE CARDIOVASCULAR EXAMINATION: ICD-10-CM

## 2024-03-15 NOTE — TELEPHONE ENCOUNTER
Pt called back asking if able to have test in [facility] office.  Instructed that the test is a CTA which not done at [facility].    Instructed once scheduled CTA need to have blood work done no more the 30 days prior to CTA test date.

## 2024-03-15 NOTE — TELEPHONE ENCOUNTER
Spoke to Jaskaran, explained we are waiting for precert and I will call him back when I get the Auth Number. [id number]

## 2024-03-15 NOTE — TELEPHONE ENCOUNTER
Jaskaran Ch needs to schedule a CTA Coronary Artery with IV contrast at [place]  [facility] [id number]   1960  Will need precert  x

## 2024-03-15 NOTE — TELEPHONE ENCOUNTER
Please call Jaskaran and give him the information to schedule for CT coronary angiography.I put all the orders in the chart

## 2024-03-15 NOTE — TELEPHONE ENCOUNTER
I called and spoke to Jaskaran.  I reviewed the results of the stress test and explained to him that it was abnormal.  We discussed further diagnostic and treatment options including medical management, CT coronary angiography and left heart cath and coronary angiography.  I explained the risk, and benefits of each.  He would like to proceed with CT coronary angiography given that he is not having any symptoms of shortness of breath or chest discomfort.    I will arrange for Jaskaran to undergo CT coronary angiography.    We had a prolonged discussion about these complex clinical issues and went over the various important aspects to consider. All questions were answered.      He will call with any further questions or concerns.

## 2024-03-15 NOTE — TELEPHONE ENCOUNTER
Pre-cert Request    Name of patient: Jaskaran Simpson    Name of physician: Nestor Can MD    Type of test: [id number] - CTA CORONARY ARTERY W IV CONTRAST    Insurance: [employer]    Location having test done:   [facility]        NPI of location:  [id number]        Scheduled/Expected date for test: As soon as possible     Additional notes: Please call patient once Authorized [phone]'

## 2024-03-22 NOTE — TELEPHONE ENCOUNTER
Per ZUNILDA the information provided does not support the medical necessity of these services to make a determination on this case.     A Peer to Peer discussion can be completed by calling P# [phone]    Tracking# [id number]

## 2024-04-10 LAB
BUN SERPL-MCNC: 19 MG/DL (ref 8–27)
BUN/CREAT SERPL: 19 (ref 10–24)
CALCIUM SERPL-MCNC: 8.9 MG/DL (ref 8.6–10.2)
CHLORIDE SERPL-SCNC: 100 MMOL/L (ref 96–106)
CO2 SERPL-SCNC: 23 MMOL/L (ref 20–29)
CREAT SERPL-MCNC: 1 MG/DL (ref 0.76–1.27)
EGFRCR SERPLBLD CKD-EPI 2021: 85 ML/MIN/1.73
GLUCOSE SERPL-MCNC: 107 MG/DL (ref 70–99)
POTASSIUM SERPL-SCNC: 4.7 MMOL/L (ref 3.5–5.2)
SODIUM SERPL-SCNC: 139 MMOL/L (ref 134–144)

## 2024-04-11 ENCOUNTER — TELEPHONE (OUTPATIENT)
Dept: RADIOLOGY | Age: 64
End: 2024-04-11
Payer: COMMERCIAL

## 2024-04-11 NOTE — TELEPHONE ENCOUNTER
Reviewing patient data for Cardiac CTA.  
Spine appears normal, range of motion is not limited, no muscle or joint tenderness

## 2024-04-16 ENCOUNTER — HOSPITAL ENCOUNTER (OUTPATIENT)
Dept: RADIOLOGY | Age: 64
Discharge: HOME | End: 2024-04-16
Attending: INTERNAL MEDICINE
Payer: COMMERCIAL

## 2024-04-16 VITALS
BODY MASS INDEX: 26.48 KG/M2 | SYSTOLIC BLOOD PRESSURE: 171 MMHG | RESPIRATION RATE: 15 BRPM | HEIGHT: 70 IN | DIASTOLIC BLOOD PRESSURE: 86 MMHG | WEIGHT: 185 LBS | OXYGEN SATURATION: 100 % | HEART RATE: 61 BPM

## 2024-04-16 DIAGNOSIS — R94.39 ABNORMAL NUCLEAR STRESS TEST: ICD-10-CM

## 2024-04-16 PROCEDURE — 63600105 HC IODINE BASED CONTRAST: Performed by: INTERNAL MEDICINE

## 2024-04-16 PROCEDURE — 63700000 HC SELF-ADMINISTRABLE DRUG: Performed by: INTERNAL MEDICINE

## 2024-04-16 PROCEDURE — 75580 N-INVAS EST C FFR SW ALY CTA: CPT

## 2024-04-16 PROCEDURE — 75574 CT ANGIO HRT W/3D IMAGE: CPT

## 2024-04-16 RX ORDER — IOPAMIDOL 755 MG/ML
85 INJECTION, SOLUTION INTRAVASCULAR
Status: COMPLETED | OUTPATIENT
Start: 2024-04-16 | End: 2024-04-16

## 2024-04-16 RX ORDER — METOPROLOL TARTRATE 25 MG/1
25 TABLET, FILM COATED ORAL ONCE
Status: DISCONTINUED | OUTPATIENT
Start: 2024-04-16 | End: 2024-04-17 | Stop reason: HOSPADM

## 2024-04-16 RX ORDER — NITROGLYCERIN 0.4 MG/1
0.4 TABLET SUBLINGUAL ONCE
Status: COMPLETED | OUTPATIENT
Start: 2024-04-16 | End: 2024-04-16

## 2024-04-16 RX ADMIN — IOPAMIDOL 85 ML: 755 INJECTION, SOLUTION INTRAVENOUS at 13:51

## 2024-04-16 RX ADMIN — NITROGLYCERIN 0.4 MG: 0.4 TABLET SUBLINGUAL at 13:40

## 2024-04-16 NOTE — PROGRESS NOTES
"Jaskaran Simpson   [id number]    Your doctor has referred you for a CT examination that requires the injection of an iodinated contrast material into your bloodstream. This iodinated contrast material, sometimes referred to as \"x-ray dye\" allows for better interpretation of the x-ray films or CT images and results in a more accurate interpretation of the examination.     Without the use of iodinated contrast (x-ray dye), the examination may be less informative and result in a suboptimal examination, and possibly a delay in diagnosis and, if needed, treatment.     The iodinated contrast material is given through a small needle or catheter placed into a vein, usually on the inside of the elbow, on the back of hand, or in a vein in the foot or lower leg.    The most common, though still rare, potential reaction to an intravenous contrast injection is an allergic-like reaction. Most allergic-like reactions are mild, though a small subset of people can have more severe reactions. Mild reactions include mild / scattered hives, itching, scratchy throat, sneezing and nasal congestion. More severe reactions include facial swelling, severe difficulty breathing, wheezing and anaphylactic shock. Those with a prior history allergic-like reaction to the same class of contrast media (such as CT contrast or MRI contrast) are at the highest risk for an allergic reaction.     If you believe you had an allergic reaction to contrast in the past, please let our staff know. We can determine if this increases your risk for a future reaction and provide steps to decrease the risk. This may delay your examination, but it decreases the risk of having a new and possibly more severe reaction to the contrast injection.    People with a history of prior allergic reactions to other substances (such as unrelated medications and food) and patients with a history of asthma have slightly increased risk for an allergic reaction from contrast material " when compared with the general population, but do not require to be pretreated prior to a contrast injection.    You should notify the physician, nurse or technologist if you have ever had any of these conditions or if you have any questions.

## 2024-04-16 NOTE — DISCHARGE INSTRUCTIONS
Jaskaran Simpson   [id number]   04/16/24    Cardiac CTA Patient Discharge Instructions      Thank you for allowing our CT staff to participate in your care today.  We would like to provide you with some easy to follow instructions before you leave.    DRINK PLENTY OF FLUIDS  Now that your scan is complete, you will need to drink plenty of fluids, preferably water.    DO NOT drink any caffeinated beverages the rest of the day (coffee, tea, caffeinated sodas).    DO NOT drink any alcoholic beverages for the next 24 hours.  We ask you to drink these fluids to dilute the x-ray dye that was used for your scan and allow it to flush through your kidneys.  Caffeine and alcohol will not allow this flushing to occur effectively.       MEDICATION CHANGES:  no    If you have any questions about this, please contact your primary care physician.    If you need immediate medical attention, please go to the nearest Emergency Department or dial 911.    By signing this form, I acknowledge these instructions have been explained to me to my satisfaction and all my questions have been answered.  I have received a copy of this form.

## 2024-04-16 NOTE — PROGRESS NOTES
"Jaskaran Simpson   [id number]    Your doctor has referred you for a CT examination that requires the injection of an iodinated contrast material into your bloodstream. This iodinated contrast material, sometimes referred to as \"x-ray dye\" allows for better interpretation of the x-ray films or CT images and results in a more accurate interpretation of the examination.     Without the use of iodinated contrast (x-ray dye), the examination may be less informative and result in a suboptimal examination, and possibly a delay in diagnosis and, if needed, treatment.     The iodinated contrast material is given through a small needle or catheter placed into a vein, usually on the inside of the elbow, on the back of hand, or in a vein in the foot or lower leg.    The most common, though still rare, potential reaction to an intravenous contrast injection is an allergic-like reaction. Most allergic-like reactions are mild, though a small subset of people can have more severe reactions. Mild reactions include mild / scattered hives, itching, scratchy throat, sneezing and nasal congestion. More severe reactions include facial swelling, severe difficulty breathing, wheezing and anaphylactic shock. Those with a prior history allergic-like reaction to the same class of contrast media (such as CT contrast or MRI contrast) are at the highest risk for an allergic reaction.     If you believe you had an allergic reaction to contrast in the past, please let our staff know. We can determine if this increases your risk for a future reaction and provide steps to decrease the risk. This may delay your examination, but it decreases the risk of having a new and possibly more severe reaction to the contrast injection.    People with a history of prior allergic reactions to other substances (such as unrelated medications and food) and patients with a history of asthma have slightly increased risk for an allergic reaction from contrast material " when compared with the general population, but do not require to be pretreated prior to a contrast injection.    You should notify the physician, nurse or technologist if you have ever had any of these conditions or if you have any questions.

## 2024-04-16 NOTE — PROGRESS NOTES
Staff present during Radiology Nurse encounter:    Nurse: FLORINA Parra  Technologist: GHISLAINE Johnson  Cardiologist: Dr Valentino  Ordering Physician: Dr Martínez  Clinical Indication: ECG abnormal, high CAD risk (Ca score 2/2/24 1841, , , LCx 260, )    Cardiac CTA Worksheet    Chest pain (symptomatic patients):  Intermediate pre-test probability of Coronary Artery Disease    Evaluation of congenital cardiac and coronary anomales    *no rate lowering medications given  nitroglycerin (NITROSTAT) .4mg    Contrast: 85 cc Isovue 370    HR final run: 68/68/68/64/64

## 2024-04-27 LAB
ALBUMIN SERPL-MCNC: 4.8 G/DL (ref 3.9–4.9)
ALBUMIN/GLOB SERPL: 2.1 {RATIO} (ref 1.2–2.2)
ALP SERPL-CCNC: 76 IU/L (ref 44–121)
ALT SERPL-CCNC: 14 IU/L (ref 0–44)
AST SERPL-CCNC: 17 IU/L (ref 0–40)
BILIRUB SERPL-MCNC: 0.5 MG/DL (ref 0–1.2)
BUN SERPL-MCNC: 10 MG/DL (ref 8–27)
BUN/CREAT SERPL: 12 (ref 10–24)
CALCIUM SERPL-MCNC: 8.9 MG/DL (ref 8.6–10.2)
CHLORIDE SERPL-SCNC: 100 MMOL/L (ref 96–106)
CHOLEST SERPL-MCNC: 168 MG/DL (ref 100–199)
CO2 SERPL-SCNC: 23 MMOL/L (ref 20–29)
CREAT SERPL-MCNC: 0.85 MG/DL (ref 0.76–1.27)
EGFRCR SERPLBLD CKD-EPI 2021: 98 ML/MIN/1.73
GLOBULIN SER CALC-MCNC: 2.3 G/DL (ref 1.5–4.5)
GLUCOSE SERPL-MCNC: 113 MG/DL (ref 70–99)
HBA1C MFR BLD: 5.6 % (ref 4.8–5.6)
HDLC SERPL-MCNC: 73 MG/DL
LDLC SERPL CALC-MCNC: 82 MG/DL (ref 0–99)
POTASSIUM SERPL-SCNC: 4.7 MMOL/L (ref 3.5–5.2)
PROT SERPL-MCNC: 7.1 G/DL (ref 6–8.5)
SODIUM SERPL-SCNC: 141 MMOL/L (ref 134–144)
TRIGL SERPL-MCNC: 66 MG/DL (ref 0–149)
VLDLC SERPL CALC-MCNC: 13 MG/DL (ref 5–40)

## 2024-04-29 NOTE — PROGRESS NOTES
Cardiology Note       Reason for visit: Coronary artery calcification      Jaskaran returns today for follow-up.  He had undergone CT coronary angiography which showed approximately 70% stenosis in the LAD that was borderline hemodynamically significant.  As you recall, his stress test was suggestive of ischemia in the inferior wall and not the anterior wall.    I have reviewed all of these findings with Jaskaran.  Clinically, he has been doing well without any chest discomfort, shortness of breath, palpitations or dizziness.  No neurological or TIA-like symptoms.  No slurred speech, unilateral weakness or visual loss.    He does tell me that he has stopped smoking.        Past Medical History:   Diagnosis Date    Agatston coronary artery calcium score greater than 400 2024    Ascending aorta dilatation (CMS/HCC) 2024    Hypertension     Mixed hyperlipidemia 2009     Past Surgical History:   Procedure Laterality Date    KNEE SURGERY       Social History     Tobacco Use    Smoking status: Former     Packs/day: 0.50     Years: 20.00     Additional pack years: 0.00     Total pack years: 10.00     Types: Cigarettes     Quit date: 2024     Years since quittin.1    Smokeless tobacco: Never   Substance Use Topics    Alcohol use: Not Currently     Comment: 2 drinks per day- stopped drinking 2/3/2024    Drug use: Not Currently      Family History   Problem Relation Age of Onset    Heart attack Biological Mother         passed away when she was 77    Hypertension Biological Mother     Hyperlipidemia Biological Mother     Colon cancer Biological Father     Heart attack Biological Father     Prostate cancer Neg Hx      Patient has no known allergies.  Current Outpatient Medications   Medication Instructions    aspirin 81 mg, oral, Daily    rosuvastatin (CRESTOR) 20 mg, oral, Daily          Review of Systems   Cardiovascular:  Negative for chest pain, dyspnea on exertion, irregular heartbeat, leg  swelling, near-syncope, orthopnea, palpitations, paroxysmal nocturnal dyspnea and syncope.   Neurological:  Negative for dizziness and light-headedness.      Objective    Vitals:    04/30/24 1422   BP: 130/80   Pulse: 62   Resp: 20      Wt Readings from Last 3 Encounters:   04/30/24 82.6 kg (182 lb)   04/16/24 83.9 kg (185 lb)   03/12/24 84.8 kg (187 lb)      Physical Exam  Neck:      Vascular: No carotid bruit or JVD.   Cardiovascular:      Rate and Rhythm: Normal rate and regular rhythm.      Pulses:           Carotid pulses are 2+ on the right side and 2+ on the left side.       Dorsalis pedis pulses are 2+ on the right side and 2+ on the left side.        Posterior tibial pulses are 2+ on the right side and 2+ on the left side.      Heart sounds: S1 normal and S2 normal. No murmur heard.     No friction rub. No gallop.   Pulmonary:      Effort: Pulmonary effort is normal.      Breath sounds: Normal breath sounds.   Musculoskeletal:      Right lower leg: No edema.      Left lower leg: No edema.   Skin:     General: Skin is warm and dry.   Neurological:      Mental Status: He is alert.   Psychiatric:         Behavior: Behavior normal.                   CT coronary angiogram 4/16/2024.  CORONARY ARTERIES     LEFT MAIN: Arises from the left coronary cusp and bifurcates into a left  anterior descending and left circumflex arteries.  Calcified plaque results in  minimal (<25%) stenosis.     LAD: Arises from the left main coronary artery and gives rise to three diagonal  branches before terminating at the left ventricular apex.  There is calcified  plaque in the proximal to mid vessel around the area of the first diagonal which  results in moderate (70%) stenosis that is borderline hemodynamically  significant with a drop of CT FFR value of 0.95 to 0.83 after the lesion.  The  remainder of the vessel slowly tapers with a slowly decreasing CT FFR down to  0.69 at the left ventricular apex.  Diagonal branches are small  in size and  appear patent.     LCX: Arises from the left main coronary artery and immediately bifurcates into  the first obtuse marginal before coursing inferolaterally in the left  atrioventricular groove and giving rise to a second obtuse marginal branch  before terminating as a diminutive vessel.  There is calcified plaque at the  vessel ostium and the mid vessel that results in minimal (<25%) stenosis.  There  is calcified plaque in the first obtuse marginal that results in minimal (<25%)  stenosis.  There is calcified plaque in the second obtuse marginal that results  in minimal (<25%) stenosis.     RCA: Arises from the right coronary cusp and courses in the right  atrioventricular groove before wrapping around the inferior aspect of the heart  and giving rise to right posterior descending artery and right posterior lateral  branch.  There is calcified plaque in the proximal vessel that results in  minimal (<25%) stenosis.  There is noncalcified plaque and mixed etiology plaque  in the distal vessel which results in mild (<50 %) stenosis. The posterior  descending artery and posterior lateral branches appear patent.     CORONARY CALCIUM COMPOSITE AGATSTON SCORE: 1978  LM: 518    LAD: 637    LCX: 224    RCA: 599  This places the patient in the SORIA 98th risk percentile for age and gender  matched individuals.     SEGMENT INVOLVEMENT SCORE:  TOTAL: 8    LM: 1    LAD: 2    LCX: 3    RCA: 2     CARDIAC MORPHOLOGY     LEFT VENTRICLE: Normal in appearance.  LEFT ATRIUM: Mildly dilated.  LEFT ATRIAL APPENDAGE: Normal in appearance.  RIGHT VENTRICLE: Normal in appearance.  RIGHT ATRIUM: Normal.  AORTIC VALVE: Appears tricuspid.  MITRAL VALVE: Normal in appearance.  TRICUSPID VALVE: Not well visualized.  PULMONIC VALVE: Not well visualized.     PERICARDIUM: Normal in appearance.     AORTA     AORTIC ROOT: Mild calcification.  SINUS OF VALSALVA: Maximal diameter 39 mm.  ASCENDING THORACIC AORTA: Maximal diameter 41  mm.     SUMMARY:  1.  Multivessel, borderline obstructive coronary artery disease. There is  calcified plaque in the proximal to mid vessel around the area of the first  diagonal which results in moderate (50-70%) that is borderline hemodynamically  significant with a drop of CT FFR value of 0.95 to 0.83 after the lesion.  The  remainder of the vessel slowly tapers with a slowly decreasing CT FFR down to  0.69 at the left ventricular apex.  The remainder of the epicardial vessels  exhibit mild (<50%) stenosis.  2.  Normal cardiac morphology, imaged in diastole.  3.  Normal aortic root size with a maximal diameter of 39 mm at the sinuses of  Valsalva.  Mildly dilated ascending thoracic aorta measuring 41 mm.     Cardiac interpretation by Chin Santamaria M.D. on 4/16/2024 2:18 PM.  ________________________________________________________________________________  _  The cardiac CT evaluation is a focus examination and does not include the entire  extent of structures listed below:     CT DOSE: One or more dose reduction techniques (e.g. automated exposure control,  adjustment of the mA and/or kV according to patient size, use of iterative  reconstruction technique) utilized for this examination.     NON-CARDIAC FINDINGS:     AORTA: Dilated ascending aorta measuring 4.1 cm. Dilated aortic root, measuring  4.4 cm at the sinus of Valsalva.  PULMONARY VEINS: Unremarkable  PULMONARY ARTERIES: Unremarkable  SVC: Unremarkable  IVC: Unremarkable  LUNG FIELDS: Unremarkable  LYMPH NODES: Unremarkable  OSSEOUS STRUCTURES: Unremarkable  OTHER: Unremarkable     --  IMPRESSION:  1. See above cardiologist summary.  2. Dilated aortic root and ascending aorta measuring up to 4.4 cm at the sinus  of Valsalva.      Exercise nuclear stress test.  3/12/2024  This is an abnormal exercise nuclear stress test.  Below average exercise capacity.  Exercise EKG negative for ischemia or arrhythmia.  Perfusion images demonstrate a moderate to large  area of moderately reduced isotope uptake in the basal and mid inferior, basal inferoseptal and basal inferolateral segments.  At rest, the basal and mid inferior defect is fixed, but there is improvement in the basal inferoseptal and basal inferolateral segments.  Although there is significant motion and adjacent GI uptake on resting images, findings are concerning for a small amount of ischemia in the territory of the right coronary artery.  LVEF 59% with grossly normal wall motion.       Transthoracic echocardiogram.  3/12/2024    Left Ventricle: Normal ventricle size. Normal wall thickness. Estimated EF 65-70%. No regional wall motion abnormalities. Normal diastolic filling pattern for age.    Right Ventricle: Normal ventricle size. Normal systolic function.    Left Atrium: Normal sized atrium.    Mitral Valve: Normal leaflet structure. Normal leaflet motion. Trace regurgitation. The jet is centrally directed. No stenosis.    Right Atrium: Normal sized atrium.    Aortic Valve: Tricuspid valve.  Sclerotic leaflets. No regurgitation. No stenosis.    Tricuspid Valve: Normal structure. Mild regurgitation. The regurgitation jet is central. Estimated RVSP = 25 mmHg.    Aorta:  Mild dilatation at the sinuses of Valsalva. Mild dilatation of the aortic root.  4 cm.   Mild dilatation of the ascending aorta.  4.3 cm.    No prior echocardiogram for comparison.       CT coronary calcium score 2/16/2024.  2) CARDIAC FINDINGS:1   CORONARY CALCIUM COMPOSITE AGATSTON SCORE: 1841     Left Main: 274  LAD: 709  LCX: 260  RCA: 598     3) NON-CARDIAC FINDINGS  AORTA: Dilated ascending aorta measuring 4.0 cm.          Assessment/Plan    Agatston coronary artery calcium score greater than 400  Asymptomatic without any anginal symptoms.    I have reviewed all the findings of the tests with Jaskaran.  With shared clinical decision making we will continue with medical management.    He will continue on the aspirin and statin therapy.    I  reviewed with him what symptoms to look for that would indicate progression of his coronary artery disease.    Ascending aorta dilatation (CMS/HCC)  Mild dilatation of the ascending aorta.  It measured 4.4 cm at the level of the sinus of Valsalva on CTA.  I will continue to follow him clinically and with serial imaging studies.    I discussed with him ways to reduce the risk of aortic growth including abstinence from smoking, avoiding heavy lifting and maintaining a blood pressure of less than 130/80.    Mixed hyperlipidemia  Continue rosuvastatin 20 mg daily.  His last LDL was 179 prior to starting the medication.  He will get a repeat lipid profile in 3 months.    We had a prolonged discussion about these complex clinical issues and went over the various important aspects to consider. All questions were answered.    Jaskaran will be coming due for his day-to-day care.  He will return to see me in 6 months time or sooner if there is a problem.  I will call him with the results of his blood work after he has it performed.  He will call with any questions or concerns in the interim.            Thank you for allowing me to participate in the care of this patient.  If you have any questions please don't hesitate to contact me.    I spent 35 minutes on this date of service performing the following activities: obtaining history, performing examination, entering orders, documenting, preparing for visit, obtaining / reviewing records, providing counseling and education and communicating results.     Nestor Can MD [facility]   5/2/2024  8:45 AM

## 2024-04-30 ENCOUNTER — OFFICE VISIT (OUTPATIENT)
Dept: CARDIOLOGY | Facility: CLINIC | Age: 64
End: 2024-04-30
Payer: COMMERCIAL

## 2024-04-30 VITALS
HEIGHT: 70 IN | DIASTOLIC BLOOD PRESSURE: 80 MMHG | WEIGHT: 182 LBS | BODY MASS INDEX: 26.05 KG/M2 | RESPIRATION RATE: 20 BRPM | SYSTOLIC BLOOD PRESSURE: 130 MMHG | HEART RATE: 62 BPM

## 2024-04-30 DIAGNOSIS — I77.810 ASCENDING AORTA DILATATION (CMS/HCC): ICD-10-CM

## 2024-04-30 DIAGNOSIS — E78.2 MIXED HYPERLIPIDEMIA: ICD-10-CM

## 2024-04-30 DIAGNOSIS — R93.1 AGATSTON CORONARY ARTERY CALCIUM SCORE GREATER THAN 400: Primary | ICD-10-CM

## 2024-04-30 PROCEDURE — 3008F BODY MASS INDEX DOCD: CPT | Performed by: INTERNAL MEDICINE

## 2024-04-30 PROCEDURE — 99214 OFFICE O/P EST MOD 30 MIN: CPT | Performed by: INTERNAL MEDICINE

## 2024-04-30 ASSESSMENT — ENCOUNTER SYMPTOMS
SYNCOPE: 0
PND: 0
NEAR-SYNCOPE: 0
PALPITATIONS: 0
DYSPNEA ON EXERTION: 0
ORTHOPNEA: 0
IRREGULAR HEARTBEAT: 0
LIGHT-HEADEDNESS: 0
DIZZINESS: 0

## 2024-04-30 NOTE — PATIENT INSTRUCTIONS
Patient Education   Mediterranean Diet  A Mediterranean diet refers to food and lifestyle choices that are based on the traditions of countries located on the [place]. It focuses on eating more fruits, vegetables, whole grains, beans, nuts, seeds, and heart-healthy fats, and eating less dairy, meat, eggs, and processed foods with added sugar, salt, and fat. This way of eating has been shown to help prevent certain conditions and improve outcomes for people who have chronic diseases, like kidney disease and heart disease.  What are tips for following this plan?  Reading food labels  Check the serving size of packaged foods. For foods such as rice and pasta, the serving size refers to the amount of cooked product, not dry.  Check the total fat in packaged foods. Avoid foods that have saturated fat or trans fats.  Check the ingredient list for added sugars, such as corn syrup.  Shopping    Buy a variety of foods that offer a balanced diet, including:  Fresh fruits and vegetables (produce).  Grains, beans, nuts, and seeds. Some of these may be available in unpackaged forms or large amounts (in bulk).  Fresh seafood.  Poultry and eggs.  Low-fat dairy products.  Buy whole ingredients instead of prepackaged foods.  Buy fresh fruits and vegetables in-season from local [employer] markets.  Buy plain frozen fruits and vegetables.  If you do not have access to quality fresh seafood, buy precooked frozen shrimp or canned fish, such as tuna, salmon, or sardines.  Stock your pantry so you always have certain foods on hand, such as olive oil, canned tuna, canned tomatoes, rice, pasta, and beans.  Cooking  Cook foods with extra-virgin olive oil instead of using butter or other vegetable oils.  Have meat as a side dish, and have vegetables or grains as your main dish. This means having meat in small portions or adding small amounts of meat to foods like pasta or stew.  Use beans or vegetables instead of meat in common dishes  like chili or lasagna.  [place] with different cooking methods. Try roasting, broiling, steaming, and sautéing vegetables.  Add frozen vegetables to soups, stews, pasta, or rice.  Add nuts or seeds for added healthy fats and plant protein at each meal. You can add these to yogurt, salads, or vegetable dishes.  Marinate fish or vegetables using olive oil, lemon juice, garlic, and fresh herbs.  Meal planning  Plan to eat one vegetarian meal one day each week. Try to work up to two vegetarian meals, if possible.  Eat seafood two or more times a week.  Have healthy snacks readily available, such as:  Vegetable sticks with hummus.  Greek yogurt.  Fruit and nut trail mix.  Eat balanced meals throughout the week. This includes:  Fruit: 2-3 servings a day.  Vegetables: 4-5 servings a day.  Low-fat dairy: 2 servings a day.  Fish, poultry, or lean meat: 1 serving a day.  Beans and legumes: 2 or more servings a week.  Nuts and seeds: 1-2 servings a day.  Whole grains: 6-8 servings a day.  Extra-virgin olive oil: 3-4 servings a day.  Limit red meat and sweets to only a few servings a month.  Lifestyle    Cook and eat meals together with your family, when possible.  Drink enough fluid to keep your urine pale yellow.  Be physically active every day. This includes:  Aerobic exercise like running or swimming.  Leisure activities like gardening, walking, or housework.  Get 7-8 hours of sleep each night.  If recommended by your health care provider, drink red wine in moderation. This means 1 glass a day for nonpregnant women and 2 glasses a day for men. A glass of wine equals 5 oz (150 mL).  What foods should I eat?  Fruits  Apples. Apricots. Avocado. Berries. Bananas. Cherries. Dates. Figs. Grapes. Polly. Melon. Oranges. Peaches. Plums. Pomegranate.  Vegetables  Artichokes. Beets. Broccoli. Cabbage. Carrots. Eggplant. Green beans. Chard. Kale. Spinach. Onions. Leeks. Peas. Squash. Tomatoes. Peppers.  Radishes.  Grains  Whole-grain pasta. Brown rice. Bulgur wheat. Polenta. Couscous. Whole-wheat bread. Oatmeal. Quinoa.  Meats and other proteins  Beans. Almonds. Sunflower seeds. Pine nuts. Peanuts. Cod. Lottie. Scallops. Shrimp. Tuna. Tilapia. Clams. Oysters. Eggs. Poultry without skin.  Dairy  Low-fat milk. Cheese. Greek yogurt.  Fats and oils  Extra-virgin olive oil. Avocado oil. Grapeseed oil.  Beverages  Water. Red wine. Herbal tea.  Sweets and desserts  Greek yogurt with honey. Baked apples. Poached pears. Trail mix.  Seasonings and condiments  Basil. Cilantro. Coriander. Cumin. Mint. Parsley. Andrey. Rosemary. Tarragon. Garlic. Oregano. Thyme. Pepper. Balsamic vinegar. Tahini. Hummus. Tomato sauce. Olives. Mushrooms.  The items listed above may not be a complete list of foods and beverages you can eat. Contact a dietitian for more information.  What foods should I limit?  This is a list of foods that should be eaten rarely or only on special occasions.  Fruits  Fruit canned in syrup.  Vegetables  Deep-fried potatoes (french fries).  Grains  Prepackaged pasta or rice dishes. Prepackaged cereal with added sugar. Prepackaged snacks with added sugar.  Meats and other proteins  Beef. Pork. Lamb. Poultry with skin. Hot dogs. Crump.  Dairy  Ice cream. Sour cream. Whole milk.  Fats and oils  Butter. Canola oil. Vegetable oil. Beef fat (tallow). Lard.  Beverages  Juice. Sugar-sweetened soft drinks. Beer. Liquor and spirits.  Sweets and desserts  Cookies. Cakes. Pies. Candy.  Seasonings and condiments  Mayonnaise. Pre-made sauces and marinades.  The items listed above may not be a complete list of foods and beverages you should limit. Contact a dietitian for more information.  Summary  The Mediterranean diet includes both food and lifestyle choices.  Eat a variety of fresh fruits and vegetables, beans, nuts, seeds, and whole grains.  Limit the amount of red meat and sweets that you eat.  If recommended by your health  care provider, drink red wine in moderation. This means 1 glass a day for nonpregnant women and 2 glasses a day for men. A glass of wine equals 5 oz (150 mL).  This information is not intended to replace advice given to you by your health care provider. Make sure you discuss any questions you have with your health care provider.  Document Revised: 01/22/2021 Document Reviewed: 11/19/2020  Elsevier Patient Education © 2023 [employer]

## 2024-04-30 NOTE — LETTER
May 2, 2024     Deepa Vega MD  [address] [address]    Patient: Jaskaran Simpson  YOB: 1960  Date of Visit: 2024      Dear Dr. Vega:    Thank you for referring Jaskaran Simpson to me for evaluation. Below are my notes for this consultation.    If you have questions, please do not hesitate to call me. I look forward to following your patient along with you.         Sincerely,        Nestor Can MD        CC: No Recipients    Nestor Can MD  2024  8:47 AM  Sign when Signing Visit     Cardiology Note       Reason for visit: Coronary artery calcification      Jaskaran returns today for follow-up.  He had undergone CT coronary angiography which showed approximately 70% stenosis in the LAD that was borderline hemodynamically significant.  As you recall, his stress test was suggestive of ischemia in the inferior wall and not the anterior wall.    I have reviewed all of these findings with Jaskaran.  Clinically, he has been doing well without any chest discomfort, shortness of breath, palpitations or dizziness.  No neurological or TIA-like symptoms.  No slurred speech, unilateral weakness or visual loss.    He does tell me that he has stopped smoking.        Past Medical History:   Diagnosis Date   • Agatston coronary artery calcium score greater than 400 2024   • Ascending aorta dilatation (CMS/HCC) 2024   • Hypertension    • Mixed hyperlipidemia 2009     Past Surgical History:   Procedure Laterality Date   • KNEE SURGERY       Social History     Tobacco Use   • Smoking status: Former     Packs/day: 0.50     Years: 20.00     Additional pack years: 0.00     Total pack years: 10.00     Types: Cigarettes     Quit date: 2024     Years since quittin.1   • Smokeless tobacco: Never   Substance Use Topics   • Alcohol use: Not Currently     Comment: 2 drinks per day- stopped drinking 2/3/2024   • Drug use: Not Currently       Family History   Problem Relation Age of Onset   • Heart attack Biological Mother         passed away when she was 77   • Hypertension Biological Mother    • Hyperlipidemia Biological Mother    • Colon cancer Biological Father    • Heart attack Biological Father    • Prostate cancer Neg Hx      Patient has no known allergies.  Current Outpatient Medications   Medication Instructions   • aspirin 81 mg, oral, Daily   • rosuvastatin (CRESTOR) 20 mg, oral, Daily          Review of Systems   Cardiovascular:  Negative for chest pain, dyspnea on exertion, irregular heartbeat, leg swelling, near-syncope, orthopnea, palpitations, paroxysmal nocturnal dyspnea and syncope.   Neurological:  Negative for dizziness and light-headedness.      Objective    Vitals:    04/30/24 1422   BP: 130/80   Pulse: 62   Resp: 20      Wt Readings from Last 3 Encounters:   04/30/24 82.6 kg (182 lb)   04/16/24 83.9 kg (185 lb)   03/12/24 84.8 kg (187 lb)      Physical Exam  Neck:      Vascular: No carotid bruit or JVD.   Cardiovascular:      Rate and Rhythm: Normal rate and regular rhythm.      Pulses:           Carotid pulses are 2+ on the right side and 2+ on the left side.       Dorsalis pedis pulses are 2+ on the right side and 2+ on the left side.        Posterior tibial pulses are 2+ on the right side and 2+ on the left side.      Heart sounds: S1 normal and S2 normal. No murmur heard.     No friction rub. No gallop.   Pulmonary:      Effort: Pulmonary effort is normal.      Breath sounds: Normal breath sounds.   Musculoskeletal:      Right lower leg: No edema.      Left lower leg: No edema.   Skin:     General: Skin is warm and dry.   Neurological:      Mental Status: He is alert.   Psychiatric:         Behavior: Behavior normal.                   CT coronary angiogram 4/16/2024.  CORONARY ARTERIES     LEFT MAIN: Arises from the left coronary cusp and bifurcates into a left  anterior descending and left circumflex arteries.  Calcified  plaque results in  minimal (<25%) stenosis.     LAD: Arises from the left main coronary artery and gives rise to three diagonal  branches before terminating at the left ventricular apex.  There is calcified  plaque in the proximal to mid vessel around the area of the first diagonal which  results in moderate (70%) stenosis that is borderline hemodynamically  significant with a drop of CT FFR value of 0.95 to 0.83 after the lesion.  The  remainder of the vessel slowly tapers with a slowly decreasing CT FFR down to  0.69 at the left ventricular apex.  Diagonal branches are small in size and  appear patent.     LCX: Arises from the left main coronary artery and immediately bifurcates into  the first obtuse marginal before coursing inferolaterally in the left  atrioventricular groove and giving rise to a second obtuse marginal branch  before terminating as a diminutive vessel.  There is calcified plaque at the  vessel ostium and the mid vessel that results in minimal (<25%) stenosis.  There  is calcified plaque in the first obtuse marginal that results in minimal (<25%)  stenosis.  There is calcified plaque in the second obtuse marginal that results  in minimal (<25%) stenosis.     RCA: Arises from the right coronary cusp and courses in the right  atrioventricular groove before wrapping around the inferior aspect of the heart  and giving rise to right posterior descending artery and right posterior lateral  branch.  There is calcified plaque in the proximal vessel that results in  minimal (<25%) stenosis.  There is noncalcified plaque and mixed etiology plaque  in the distal vessel which results in mild (<50 %) stenosis. The posterior  descending artery and posterior lateral branches appear patent.     CORONARY CALCIUM COMPOSITE AGATSTON SCORE: 1978  LM: 518    LAD: 637    LCX: 224    RCA: 599  This places the patient in the SORIA 98th risk percentile for age and gender  matched individuals.     SEGMENT INVOLVEMENT  SCORE:  TOTAL: 8    LM: 1    LAD: 2    LCX: 3    RCA: 2     CARDIAC MORPHOLOGY     LEFT VENTRICLE: Normal in appearance.  LEFT ATRIUM: Mildly dilated.  LEFT ATRIAL APPENDAGE: Normal in appearance.  RIGHT VENTRICLE: Normal in appearance.  RIGHT ATRIUM: Normal.  AORTIC VALVE: Appears tricuspid.  MITRAL VALVE: Normal in appearance.  TRICUSPID VALVE: Not well visualized.  PULMONIC VALVE: Not well visualized.     PERICARDIUM: Normal in appearance.     AORTA     AORTIC ROOT: Mild calcification.  SINUS OF VALSALVA: Maximal diameter 39 mm.  ASCENDING THORACIC AORTA: Maximal diameter 41 mm.     SUMMARY:  1.  Multivessel, borderline obstructive coronary artery disease. There is  calcified plaque in the proximal to mid vessel around the area of the first  diagonal which results in moderate (50-70%) that is borderline hemodynamically  significant with a drop of CT FFR value of 0.95 to 0.83 after the lesion.  The  remainder of the vessel slowly tapers with a slowly decreasing CT FFR down to  0.69 at the left ventricular apex.  The remainder of the epicardial vessels  exhibit mild (<50%) stenosis.  2.  Normal cardiac morphology, imaged in diastole.  3.  Normal aortic root size with a maximal diameter of 39 mm at the sinuses of  Valsalva.  Mildly dilated ascending thoracic aorta measuring 41 mm.     Cardiac interpretation by Chin Santamaria M.D. on 4/16/2024 2:18 PM.  ________________________________________________________________________________  _  The cardiac CT evaluation is a focus examination and does not include the entire  extent of structures listed below:     CT DOSE: One or more dose reduction techniques (e.g. automated exposure control,  adjustment of the mA and/or kV according to patient size, use of iterative  reconstruction technique) utilized for this examination.     NON-CARDIAC FINDINGS:     AORTA: Dilated ascending aorta measuring 4.1 cm. Dilated aortic root, measuring  4.4 cm at the sinus of  Valsalva.  PULMONARY VEINS: Unremarkable  PULMONARY ARTERIES: Unremarkable  SVC: Unremarkable  IVC: Unremarkable  LUNG FIELDS: Unremarkable  LYMPH NODES: Unremarkable  OSSEOUS STRUCTURES: Unremarkable  OTHER: Unremarkable     --  IMPRESSION:  1. See above cardiologist summary.  2. Dilated aortic root and ascending aorta measuring up to 4.4 cm at the sinus  of Valsalva.      Exercise nuclear stress test.  3/12/2024  This is an abnormal exercise nuclear stress test.  Below average exercise capacity.  Exercise EKG negative for ischemia or arrhythmia.  Perfusion images demonstrate a moderate to large area of moderately reduced isotope uptake in the basal and mid inferior, basal inferoseptal and basal inferolateral segments.  At rest, the basal and mid inferior defect is fixed, but there is improvement in the basal inferoseptal and basal inferolateral segments.  Although there is significant motion and adjacent GI uptake on resting images, findings are concerning for a small amount of ischemia in the territory of the right coronary artery.  LVEF 59% with grossly normal wall motion.       Transthoracic echocardiogram.  3/12/2024  •  Left Ventricle: Normal ventricle size. Normal wall thickness. Estimated EF 65-70%. No regional wall motion abnormalities. Normal diastolic filling pattern for age.  •  Right Ventricle: Normal ventricle size. Normal systolic function.  •  Left Atrium: Normal sized atrium.  •  Mitral Valve: Normal leaflet structure. Normal leaflet motion. Trace regurgitation. The jet is centrally directed. No stenosis.  •  Right Atrium: Normal sized atrium.  •  Aortic Valve: Tricuspid valve.  Sclerotic leaflets. No regurgitation. No stenosis.  •  Tricuspid Valve: Normal structure. Mild regurgitation. The regurgitation jet is central. Estimated RVSP = 25 mmHg.  •  Aorta:  Mild dilatation at the sinuses of Valsalva. Mild dilatation of the aortic root.  4 cm.   Mild dilatation of the ascending aorta.  4.3 cm.  •   No prior echocardiogram for comparison.       CT coronary calcium score 2/16/2024.  2) CARDIAC FINDINGS:1   CORONARY CALCIUM COMPOSITE AGATSTON SCORE: 1841     Left Main: 274  LAD: 709  LCX: 260  RCA: 598     3) NON-CARDIAC FINDINGS  AORTA: Dilated ascending aorta measuring 4.0 cm.          Assessment/Plan    Agatston coronary artery calcium score greater than 400  Asymptomatic without any anginal symptoms.    I have reviewed all the findings of the tests with Jaskaran.  With shared clinical decision making we will continue with medical management.    He will continue on the aspirin and statin therapy.    I reviewed with him what symptoms to look for that would indicate progression of his coronary artery disease.    Ascending aorta dilatation (CMS/HCC)  Mild dilatation of the ascending aorta.  It measured 4.4 cm at the level of the sinus of Valsalva on CTA.  I will continue to follow him clinically and with serial imaging studies.    I discussed with him ways to reduce the risk of aortic growth including abstinence from smoking, avoiding heavy lifting and maintaining a blood pressure of less than 130/80.    Mixed hyperlipidemia  Continue rosuvastatin 20 mg daily.  His last LDL was 179 prior to starting the medication.  He will get a repeat lipid profile in 3 months.    We had a prolonged discussion about these complex clinical issues and went over the various important aspects to consider. All questions were answered.    Jaskaran will be coming due for his day-to-day care.  He will return to see me in 6 months time or sooner if there is a problem.  I will call him with the results of his blood work after he has it performed.  He will call with any questions or concerns in the interim.            Thank you for allowing me to participate in the care of this patient.  If you have any questions please don't hesitate to contact me.    I spent 35 minutes on this date of service performing the following activities: obtaining  history, performing examination, entering orders, documenting, preparing for visit, obtaining / reviewing records, providing counseling and education and communicating results.     Nestor Can MD [facility]   5/2/2024  8:45 AM

## 2024-05-02 NOTE — ASSESSMENT & PLAN NOTE
Asymptomatic without any anginal symptoms.    I have reviewed all the findings of the tests with Jaskaran.  With shared clinical decision making we will continue with medical management.    He will continue on the aspirin and statin therapy.    I reviewed with him what symptoms to look for that would indicate progression of his coronary artery disease.

## 2024-05-02 NOTE — ASSESSMENT & PLAN NOTE
Mild dilatation of the ascending aorta.  It measured 4.4 cm at the level of the sinus of Valsalva on CTA.  I will continue to follow him clinically and with serial imaging studies.    I discussed with him ways to reduce the risk of aortic growth including abstinence from smoking, avoiding heavy lifting and maintaining a blood pressure of less than 130/80.

## 2024-05-02 NOTE — ASSESSMENT & PLAN NOTE
Continue rosuvastatin 20 mg daily.  His last LDL was 179 prior to starting the medication.  He will get a repeat lipid profile in 3 months.

## 2024-05-03 ENCOUNTER — OFFICE VISIT (OUTPATIENT)
Dept: FAMILY MEDICINE | Facility: CLINIC | Age: 64
End: 2024-05-03
Payer: COMMERCIAL

## 2024-05-03 VITALS
TEMPERATURE: 97.1 F | HEIGHT: 70 IN | WEIGHT: 187.6 LBS | DIASTOLIC BLOOD PRESSURE: 88 MMHG | HEART RATE: 64 BPM | BODY MASS INDEX: 26.86 KG/M2 | OXYGEN SATURATION: 99 % | SYSTOLIC BLOOD PRESSURE: 152 MMHG

## 2024-05-03 DIAGNOSIS — I25.10 CORONARY ARTERY DISEASE INVOLVING NATIVE CORONARY ARTERY OF NATIVE HEART WITHOUT ANGINA PECTORIS: ICD-10-CM

## 2024-05-03 DIAGNOSIS — E78.2 MIXED HYPERLIPIDEMIA: ICD-10-CM

## 2024-05-03 DIAGNOSIS — I10 ESSENTIAL HYPERTENSION, BENIGN: Primary | ICD-10-CM

## 2024-05-03 PROBLEM — G89.29 CHRONIC PERISCAPULAR PAIN ON LEFT SIDE: Status: RESOLVED | Noted: 2023-11-21 | Resolved: 2024-05-03

## 2024-05-03 PROBLEM — M25.512 CHRONIC PERISCAPULAR PAIN ON LEFT SIDE: Status: RESOLVED | Noted: 2023-11-21 | Resolved: 2024-05-03

## 2024-05-03 PROBLEM — E78.00 PURE HYPERCHOLESTEROLEMIA: Status: RESOLVED | Noted: 2020-02-25 | Resolved: 2024-05-03

## 2024-05-03 PROCEDURE — 99214 OFFICE O/P EST MOD 30 MIN: CPT | Performed by: FAMILY MEDICINE

## 2024-05-03 PROCEDURE — 3008F BODY MASS INDEX DOCD: CPT | Performed by: FAMILY MEDICINE

## 2024-05-03 ASSESSMENT — ENCOUNTER SYMPTOMS
FEVER: 0
LIGHT-HEADEDNESS: 0
CHILLS: 0
COUGH: 0
DIZZINESS: 0
PALPITATIONS: 0
HEADACHES: 0
SHORTNESS OF BREATH: 0

## 2024-05-03 NOTE — ASSESSMENT & PLAN NOTE
BP elevated here today but normal at cardiology a few days ago.  I asked him to start checking at home and send me recordings after 1-2 weeks.

## 2024-05-03 NOTE — ASSESSMENT & PLAN NOTE
Lipids much improved.   LDL not quite at goal - 82.   He will work on continued lifestyle management and recheck in 3mo.

## 2024-05-03 NOTE — ASSESSMENT & PLAN NOTE
Asymptomatic.  Continue asa, statin.  LDL not at goal - 82  Lifestyle management, continue statin. Recheck in 3mo.

## 2024-05-03 NOTE — PROGRESS NOTES
"Subjective    Jaskaran Simpson is a 63 y.o. male presenting today for: Follow-up      HPI: 62yo male with h/o CAD, HTN, HL, former smoker here to f/u on CV issues.    He has seen Dr. Can. CTA showed moderate disease. They opted for medical management.  He has been eating a much healthier diet and quit smoking.   Active job and active at home.   No exertional symptoms.  Tolerating aspirin fine.  He does have a blood pressure cuff at home.      Health Maintenance Due   Topic Date Due    HIV Screening  Never done    RSV (60+ years old [shared decision making] or in pregnancy during 32 through 36 weeks) (1 - 1-dose 60+ series) Never done    COVID-19 Vaccine (4 - 2023-24 season) 09/01/2023         Patient Active Problem List   Diagnosis    Essential hypertension, benign    Family hx of colon cancer    Mixed hyperlipidemia    Cervical radiculitis    Routine health maintenance    Agatston coronary artery calcium score greater than 400    Ascending aorta dilatation (CMS/HCC)    Coronary artery disease involving native coronary artery of native heart without angina pectoris          Current Outpatient Medications:     aspirin 81 mg enteric coated tablet, Take 81 mg by mouth daily., Disp: , Rfl:     rosuvastatin (CRESTOR) 20 mg tablet, Take 1 tablet (20 mg total) by mouth daily., Disp: 30 tablet, Rfl: 2     No Known Allergies        Review of Systems   Constitutional:  Negative for chills and fever.   Respiratory:  Negative for cough and shortness of breath.    Cardiovascular:  Positive for chest pain (intermittent focal burning pain left chest, when sitting still). Negative for palpitations.   Neurological:  Negative for dizziness, light-headedness and headaches.        Objective  Visit Vitals  BP (!) 152/88   Pulse 64   Temp 36.2 °C (97.1 °F) (Temporal)   Ht 1.778 m (5' 10\")   Wt 85.1 kg (187 lb 9.6 oz)   SpO2 99%   BMI 26.92 kg/m²    Body mass index is 26.92 kg/m².    Wt Readings from Last 3 Encounters:   05/03/24 85.1 " kg (187 lb 9.6 oz)   04/30/24 82.6 kg (182 lb)   04/16/24 83.9 kg (185 lb)       BP Readings from Last 3 Encounters:   05/03/24 (!) 152/88   04/30/24 130/80   04/16/24 (!) 171/86            Physical Exam  Vitals reviewed.   Constitutional:       Appearance: Normal appearance. He is not ill-appearing.   HENT:      Head: Normocephalic and atraumatic.      Right Ear: External ear normal.      Left Ear: External ear normal.   Eyes:      Conjunctiva/sclera: Conjunctivae normal.      Pupils: Pupils are equal, round, and reactive to light.   Cardiovascular:      Rate and Rhythm: Normal rate and regular rhythm.      Heart sounds: No murmur heard.  Pulmonary:      Effort: Pulmonary effort is normal.      Breath sounds: Normal breath sounds. No wheezing, rhonchi or rales.   Skin:     General: Skin is warm and dry.   Neurological:      General: No focal deficit present.      Mental Status: He is alert and oriented to person, place, and time.   Psychiatric:         Mood and Affect: Mood normal.         Behavior: Behavior normal.              Laboratories      Collected Updated Procedure    04/26/2024 0641 04/27/2024 0406 Hemoglobin A1c [[id number]]    Blood, Venous    Component Value Units   Hemoglobin A1c 5.6  %          04/26/2024 0641 04/27/2024 0406 Lipid panel [[id number]]    Blood, Venous    Component Value Units   Cholesterol, Total 168 mg/dL   Triglycerides 66 mg/dL   HDL Cholesterol 73 mg/dL   VLDL Cholesterol [place] 13 mg/dL   LDL Cholesterol Calc 82 mg/dL          04/26/2024 0641 04/27/2024 0406 Comprehensive metabolic panel [[id number]]    (Abnormal)   Blood, Venous    Component Value Units   Glucose, Serum 113 High  mg/dL   BUN 10 mg/dL   Creatinine, Serum 0.85 mg/dL   eGFR 98 mL/min/1.73   BUN/Creatinine Ratio 12    Sodium, Serum 141 mmol/L   Potassium, Serum 4.7 mmol/L   Chloride, Serum 100 mmol/L   Carbon Dioxide, Total 23 mmol/L   Calcium, Serum 8.9 mg/dL   Protein, Total, Serum 7.1 g/dL   Albumin, Serum 4.8 g/dL  "  Globulin, Total 2.3 g/dL   A/G Ratio 2.1    Bilirubin, Total 0.5 mg/dL   Alkaline Phosphatase, S 76 IU/L   AST (SGOT) 17 IU/L   ALT (SGPT) 14 IU/L                  Imaging/Studies    CV NM exercise stress with amanda perf SPECT (multi study)  Order# [id number]  Reading physician: Rosi Prather MD Ordering provider: Nestor Can MD Study date: 3/12/24     Order Details  Code Code Type Category    [id number] Custom CV STRESS PROCEDURES      Priority Class Standing Status Study Status   Routine Ancillary Performed Normal Final     Patient Information    Name  Jaskaran Simpson MRN  [id number] Legal Sex  Male   1960     Performing Department       Name Address Phone   [facility] in [address] [phone]     [place]       [address]                                                                        Reason for Exam  Priority: Routine  cad   Dx: Agatston coronary artery calcium score greater than 400 [R93.1 (ICD-10-CM)]; Ascending aorta dilatation (CMS/HCC) [I77.810 (ICD-10-CM)]; Shortness of breath [R06.02 (ICD-10-CM)]     Vitals    Height Weight BSA (Calculated - sq m) BMI (Calculated) BP Pulse   1.778 m (5' 10\") 84.8 kg (187 lb) 2.05 sq meters 26.8 130/80      Interpretation Summary    This is an abnormal exercise nuclear stress test.  Below average exercise capacity.  Exercise EKG negative for ischemia or arrhythmia.  Perfusion images demonstrate a moderate to large area of moderately reduced isotope uptake in the basal and mid inferior, basal inferoseptal and basal inferolateral segments.  At rest, the basal and mid inferior defect is fixed, but there is improvement in the basal inferoseptal and basal inferolateral segments.  Although there is significant motion and adjacent GI uptake on resting images, findings are concerning for a small amount of ischemia in the territory of the right coronary artery.  LVEF 59% with grossly normal " wall motion.     Assessment/Plan  Problem List Items Addressed This Visit       Essential hypertension, benign - Primary     BP elevated here today but normal at cardiology a few days ago.  I asked him to start checking at home and send me recordings after 1-2 weeks.         Mixed hyperlipidemia     Lipids much improved.   LDL not quite at goal - 82.   He will work on continued lifestyle management and recheck in 3mo.         Coronary artery disease involving native coronary artery of native heart without angina pectoris     Asymptomatic.  Continue asa, statin.  LDL not at goal - 82  Lifestyle management, continue statin. Recheck in 3mo.                Return in about 4 months (around 9/3/2024).     Deepa Vega MD

## 2024-05-08 DIAGNOSIS — I25.10 CORONARY ARTERY DISEASE INVOLVING NATIVE CORONARY ARTERY OF NATIVE HEART WITHOUT ANGINA PECTORIS: ICD-10-CM

## 2024-05-08 RX ORDER — ROSUVASTATIN CALCIUM 20 MG/1
20 TABLET, COATED ORAL DAILY
Qty: 90 TABLET | Refills: 3 | Status: SHIPPED | OUTPATIENT
Start: 2024-05-08

## 2024-07-26 ENCOUNTER — TELEPHONE (OUTPATIENT)
Dept: FAMILY MEDICINE | Facility: CLINIC | Age: 64
End: 2024-07-26
Payer: COMMERCIAL

## 2024-07-26 NOTE — TELEPHONE ENCOUNTER
Spoke with wife who has concerns about his cognition, mood/anxiety.  I listened and will assess at upcoming visit visit.

## 2024-07-26 NOTE — TELEPHONE ENCOUNTER
Patient's wife calling with some concerns with patient's cognitive state. Would like call from doctor to discuss briefly.

## 2024-09-16 ENCOUNTER — OFFICE VISIT (OUTPATIENT)
Dept: FAMILY MEDICINE | Facility: CLINIC | Age: 64
End: 2024-09-16
Payer: COMMERCIAL

## 2024-09-16 VITALS
HEIGHT: 70 IN | SYSTOLIC BLOOD PRESSURE: 130 MMHG | BODY MASS INDEX: 26.77 KG/M2 | WEIGHT: 187 LBS | DIASTOLIC BLOOD PRESSURE: 88 MMHG | OXYGEN SATURATION: 99 % | HEART RATE: 76 BPM | TEMPERATURE: 97.8 F

## 2024-09-16 DIAGNOSIS — F10.90 ALCOHOL USE DISORDER: Primary | ICD-10-CM

## 2024-09-16 DIAGNOSIS — I25.10 CORONARY ARTERY DISEASE INVOLVING NATIVE CORONARY ARTERY OF NATIVE HEART WITHOUT ANGINA PECTORIS: ICD-10-CM

## 2024-09-16 DIAGNOSIS — F41.1 GENERALIZED ANXIETY DISORDER: ICD-10-CM

## 2024-09-16 PROCEDURE — 3075F SYST BP GE 130 - 139MM HG: CPT | Performed by: FAMILY MEDICINE

## 2024-09-16 PROCEDURE — 3079F DIAST BP 80-89 MM HG: CPT | Performed by: FAMILY MEDICINE

## 2024-09-16 PROCEDURE — 99213 OFFICE O/P EST LOW 20 MIN: CPT | Performed by: FAMILY MEDICINE

## 2024-09-16 PROCEDURE — 3008F BODY MASS INDEX DOCD: CPT | Performed by: FAMILY MEDICINE

## 2024-09-16 ASSESSMENT — ENCOUNTER SYMPTOMS
DYSPHORIC MOOD: 1
NERVOUS/ANXIOUS: 1

## 2024-09-16 ASSESSMENT — PATIENT HEALTH QUESTIONNAIRE - PHQ9
SUM OF ALL RESPONSES TO PHQ QUESTIONS 1-9: 9
SUM OF ALL RESPONSES TO PHQ9 QUESTIONS 1 & 2: 3

## 2024-09-16 NOTE — ASSESSMENT & PLAN NOTE
Congratulated patient on coming forward and seeking help for this.  Treatment options discussed.  Need to treat underlying issues including anxiety.

## 2024-09-16 NOTE — ASSESSMENT & PLAN NOTE
Treatment options reviewed.  He is reluctant to use any prescription meds right now.  He will try therapy weekly for 2 months, then follow-up with me.  If set backs with alcohol or if anxiety is not well managed, told him that we should try a medication at that point.

## 2024-09-16 NOTE — PROGRESS NOTES
"Subjective    Jaskaran Simpson is a 64 y.o. male presenting today for: Follow-up      HPI: 62yo male with h/o CAD, HTN, HL, former smoker here for anxiety and alcohol.    Anxiety: Worries a lot, constantly. Overthinks things.   Self-medicating with alcohol.  Drinking nearly daily.  6 drinks per day.  Can go a couple of days without drinking without withdrawal symptoms.   No seizures.  He wants to stop drinking.  Has appt with therapist tomorrow.   Difficulty sleeping - wakes at least 3 times per night. Able to fall back asleep relatively quickly.    GAD7 - 14  PHQ9 - 9 (0 to question 9)      Has not gone back to smoking.     Needs referral for cardiology visit.    Health Maintenance Due   Topic Date Due    HIV Screening  Never done    RSV (60+ years old [shared decision making] or in pregnancy during 32 through 36 weeks) (1 - 1-dose 60+ series) Never done    Influenza Vaccine (1) 08/01/2024    COVID-19 Vaccine (4 - 2023-24 season) 09/01/2024         Patient Active Problem List   Diagnosis    Essential hypertension, benign    Family hx of colon cancer    Mixed hyperlipidemia    Cervical radiculitis    Routine health maintenance    Agatston coronary artery calcium score greater than 400    Ascending aorta dilatation (CMS/HCC)    Coronary artery disease involving native coronary artery of native heart without angina pectoris    Alcohol use disorder    Generalized anxiety disorder          Current Outpatient Medications:     aspirin 81 mg enteric coated tablet, Take 81 mg by mouth daily., Disp: , Rfl:     rosuvastatin (CRESTOR) 20 mg tablet, Take 1 tablet (20 mg total) by mouth daily., Disp: 90 tablet, Rfl: 3     No Known Allergies        Review of Systems   Psychiatric/Behavioral:  Positive for dysphoric mood. The patient is nervous/anxious.         Objective  Visit Vitals  /88 (BP Location: Left upper arm, Patient Position: Sitting)   Pulse 76   Temp 36.6 °C (97.8 °F) (Temporal)   Ht 1.778 m (5' 10\")   Wt 84.8 kg " (187 lb)   SpO2 99%   BMI 26.83 kg/m²    Body mass index is 26.83 kg/m².    Wt Readings from Last 3 Encounters:   09/16/24 84.8 kg (187 lb)   05/03/24 85.1 kg (187 lb 9.6 oz)   04/30/24 82.6 kg (182 lb)       BP Readings from Last 3 Encounters:   09/16/24 130/88   05/03/24 (!) 152/88   04/30/24 130/80            Physical Exam  Constitutional:       Appearance: He is not ill-appearing.   HENT:      Head: Normocephalic and atraumatic.      Right Ear: External ear normal.      Left Ear: External ear normal.   Eyes:      General:         Right eye: No discharge.         Left eye: No discharge.      Conjunctiva/sclera: Conjunctivae normal.   Pulmonary:      Effort: Pulmonary effort is normal. No respiratory distress.   Skin:     Findings: No rash (on exposed skin).   Neurological:      General: No focal deficit present.      Mental Status: He is alert and oriented to person, place, and time.   Psychiatric:         Behavior: Behavior normal.              Laboratories     Latest Reference Range & Units 04/26/24 06:41   Sodium 134 - 144 mmol/L 141   Potassium, Bld 3.5 - 5.2 mmol/L 4.7   Chloride 96 - 106 mmol/L 100   CO2 20 - 29 mmol/L 23   BUN 8 - 27 mg/dL 10   Creatinine 0.76 - 1.27 mg/dL 0.85   Glucose 70 - 99 mg/dL 113 (H)   Calcium 8.6 - 10.2 mg/dL 8.9   AST (SGOT) 0 - 40 IU/L 17   ALT (SGPT) 0 - 44 IU/L 14   Alkaline Phosphatase 44 - 121 IU/L 76   Total Protein 6.0 - 8.5 g/dL 7.1   Albumin 3.9 - 4.9 g/dL 4.8   Bilirubin, Total 0.0 - 1.2 mg/dL 0.5   eGFR >59 mL/min/1.73 98   Bun/Creatinine Ratio 10 - 24  12   Globulin 1.5 - 4.5 g/dL 2.3   Cholesterol 100 - 199 mg/dL 168   Triglycerides 0 - 149 mg/dL 66   HDL >39 mg/dL 73   LDL Calculated 0 - 99 mg/dL 82   Hemoglobin A1C 4.8 - 5.6 % 5.6   ALBUMIN/GLOBULIN RATIO 1.2 - 2.2  2.1   VLDL Cholesterol [place] 5 - 40 mg/dL 13   (H): Data is abnormally high    Imaging/Studies         Assessment/Plan  Diagnoses and all orders for this visit:    Alcohol use disorder  (Primary)  Assessment & Plan:  Congratulated patient on coming forward and seeking help for this.  Treatment options discussed.  Need to treat underlying issues including anxiety.        Coronary artery disease involving native coronary artery of native heart without angina pectoris  Assessment & Plan:  Upcoming appt with cardiology.  Referral placed.    Orders:  -     Ambulatory referral to Cardiology; Future    Generalized anxiety disorder  Assessment & Plan:  Treatment options reviewed.  He is reluctant to use any prescription meds right now.  He will try therapy weekly for 2 months, then follow-up with me.  If set backs with alcohol or if anxiety is not well managed, told him that we should try a medication at that point.                Return in about 2 months (around 11/16/2024).     Deepa Vega MD

## 2024-10-08 ENCOUNTER — TELEPHONE (OUTPATIENT)
Dept: SCHEDULING | Facility: CLINIC | Age: 64
End: 2024-10-08
Payer: COMMERCIAL

## 2024-10-08 DIAGNOSIS — E78.2 MIXED HYPERLIPIDEMIA: ICD-10-CM

## 2024-10-08 DIAGNOSIS — I10 ESSENTIAL HYPERTENSION, BENIGN: Primary | ICD-10-CM

## 2024-10-08 NOTE — TELEPHONE ENCOUNTER
Pt called and said the lab scripts has his old insurance on it     Pt is requesting it be updated with his [facility] insurance     Pt can be reached at [phone]

## 2024-10-08 NOTE — TELEPHONE ENCOUNTER
Called and spoke with patient.  Verified and made sure that we have correct insurance on file, which we do.  Put new order in for CMP and Lipid Panel.  Told patient to call us back if he does not see it on the portal.  Patient verbally understands and has no further questions at this time.

## 2024-10-25 LAB
ALBUMIN SERPL-MCNC: 5 G/DL (ref 3.9–4.9)
ALP SERPL-CCNC: 61 IU/L (ref 44–121)
ALT SERPL-CCNC: 47 IU/L (ref 0–44)
AST SERPL-CCNC: 24 IU/L (ref 0–40)
BILIRUB SERPL-MCNC: 0.9 MG/DL (ref 0–1.2)
BUN SERPL-MCNC: 18 MG/DL (ref 8–27)
BUN/CREAT SERPL: 17 (ref 10–24)
CALCIUM SERPL-MCNC: 9.4 MG/DL (ref 8.6–10.2)
CHLORIDE SERPL-SCNC: 101 MMOL/L (ref 96–106)
CHOLEST SERPL-MCNC: 177 MG/DL (ref 100–199)
CO2 SERPL-SCNC: 23 MMOL/L (ref 20–29)
CREAT SERPL-MCNC: 1.09 MG/DL (ref 0.76–1.27)
EGFRCR SERPLBLD CKD-EPI 2021: 76 ML/MIN/1.73
GLOBULIN SER CALC-MCNC: 2.1 G/DL (ref 1.5–4.5)
GLUCOSE SERPL-MCNC: 117 MG/DL (ref 70–99)
HDLC SERPL-MCNC: 86 MG/DL
LDLC SERPL CALC-MCNC: 74 MG/DL (ref 0–99)
POTASSIUM SERPL-SCNC: 4.3 MMOL/L (ref 3.5–5.2)
PROT SERPL-MCNC: 7.1 G/DL (ref 6–8.5)
SODIUM SERPL-SCNC: 139 MMOL/L (ref 134–144)
TRIGL SERPL-MCNC: 96 MG/DL (ref 0–149)
VLDLC SERPL CALC-MCNC: 17 MG/DL (ref 5–40)

## 2024-10-29 ENCOUNTER — OFFICE VISIT (OUTPATIENT)
Dept: CARDIOLOGY | Facility: CLINIC | Age: 64
End: 2024-10-29
Payer: COMMERCIAL

## 2024-10-29 VITALS
OXYGEN SATURATION: 98 % | SYSTOLIC BLOOD PRESSURE: 150 MMHG | BODY MASS INDEX: 28.92 KG/M2 | HEIGHT: 70 IN | HEART RATE: 86 BPM | WEIGHT: 202 LBS | DIASTOLIC BLOOD PRESSURE: 90 MMHG

## 2024-10-29 DIAGNOSIS — I77.810 ASCENDING AORTA DILATATION (CMS/HCC): ICD-10-CM

## 2024-10-29 DIAGNOSIS — R93.1 AGATSTON CORONARY ARTERY CALCIUM SCORE GREATER THAN 400: ICD-10-CM

## 2024-10-29 DIAGNOSIS — I10 ESSENTIAL HYPERTENSION, BENIGN: ICD-10-CM

## 2024-10-29 DIAGNOSIS — E78.2 MIXED HYPERLIPIDEMIA: Primary | ICD-10-CM

## 2024-10-29 LAB
ATRIAL RATE: 85
P AXIS: 57
PR INTERVAL: 156
QRS DURATION: 86
QT INTERVAL: 362
QTC CALCULATION(BAZETT): 430
R AXIS: -20
T WAVE AXIS: 48
VENTRICULAR RATE: 85

## 2024-10-29 PROCEDURE — 3008F BODY MASS INDEX DOCD: CPT | Performed by: INTERNAL MEDICINE

## 2024-10-29 PROCEDURE — 93000 ELECTROCARDIOGRAM COMPLETE: CPT | Performed by: INTERNAL MEDICINE

## 2024-10-29 PROCEDURE — 99214 OFFICE O/P EST MOD 30 MIN: CPT | Performed by: INTERNAL MEDICINE

## 2024-10-29 PROCEDURE — 3077F SYST BP >= 140 MM HG: CPT | Performed by: INTERNAL MEDICINE

## 2024-10-29 PROCEDURE — 3080F DIAST BP >= 90 MM HG: CPT | Performed by: INTERNAL MEDICINE

## 2024-10-29 RX ORDER — EZETIMIBE 10 MG/1
10 TABLET ORAL NIGHTLY
Qty: 90 TABLET | Refills: 3 | Status: SHIPPED | OUTPATIENT
Start: 2024-10-29

## 2024-10-29 RX ORDER — VALSARTAN 40 MG/1
40 TABLET ORAL DAILY
Qty: 90 TABLET | Refills: 3 | Status: SHIPPED | OUTPATIENT
Start: 2024-10-29 | End: 2025-10-29

## 2024-10-29 ASSESSMENT — ENCOUNTER SYMPTOMS
PND: 0
PALPITATIONS: 0
ORTHOPNEA: 0
SYNCOPE: 0
DIZZINESS: 0
NEAR-SYNCOPE: 0
DYSPNEA ON EXERTION: 0
LIGHT-HEADEDNESS: 0
IRREGULAR HEARTBEAT: 0

## 2024-10-29 NOTE — PATIENT INSTRUCTIONS
Patient Education   Mediterranean Diet  A Mediterranean diet refers to food and lifestyle choices that are based on the traditions of countries located on the [place]. It focuses on eating more fruits, vegetables, whole grains, beans, nuts, seeds, and heart-healthy fats, and eating less dairy, meat, eggs, and processed foods with added sugar, salt, and fat. This way of eating has been shown to help prevent certain conditions and improve outcomes for people who have chronic diseases, like kidney disease and heart disease.  What are tips for following this plan?  Reading food labels  Check the serving size of packaged foods. For foods such as rice and pasta, the serving size refers to the amount of cooked product, not dry.  Check the total fat in packaged foods. Avoid foods that have saturated fat or trans fats.  Check the ingredient list for added sugars, such as corn syrup.  Shopping    Buy a variety of foods that offer a balanced diet, including:  Fresh fruits and vegetables (produce).  Grains, beans, nuts, and seeds. Some of these may be available in unpackaged forms or large amounts (in bulk).  Fresh seafood.  Poultry and eggs.  Low-fat dairy products.  Buy whole ingredients instead of prepackaged foods.  Buy fresh fruits and vegetables in-season from local [employer] markets.  Buy plain frozen fruits and vegetables.  If you do not have access to quality fresh seafood, buy precooked frozen shrimp or canned fish, such as tuna, salmon, or sardines.  Stock your pantry so you always have certain foods on hand, such as olive oil, canned tuna, canned tomatoes, rice, pasta, and beans.  Cooking  Cook foods with extra-virgin olive oil instead of using butter or other vegetable oils.  Have meat as a side dish, and have vegetables or grains as your main dish. This means having meat in small portions or adding small amounts of meat to foods like pasta or stew.  Use beans or vegetables instead of meat in common dishes  like chili or lasagna.  [place] with different cooking methods. Try roasting, broiling, steaming, and sautéing vegetables.  Add frozen vegetables to soups, stews, pasta, or rice.  Add nuts or seeds for added healthy fats and plant protein at each meal. You can add these to yogurt, salads, or vegetable dishes.  Marinate fish or vegetables using olive oil, lemon juice, garlic, and fresh herbs.  Meal planning  Plan to eat one vegetarian meal one day each week. Try to work up to two vegetarian meals, if possible.  Eat seafood two or more times a week.  Have healthy snacks readily available, such as:  Vegetable sticks with hummus.  Greek yogurt.  Fruit and nut trail mix.  Eat balanced meals throughout the week. This includes:  Fruit: 2-3 servings a day.  Vegetables: 4-5 servings a day.  Low-fat dairy: 2 servings a day.  Fish, poultry, or lean meat: 1 serving a day.  Beans and legumes: 2 or more servings a week.  Nuts and seeds: 1-2 servings a day.  Whole grains: 6-8 servings a day.  Extra-virgin olive oil: 3-4 servings a day.  Limit red meat and sweets to only a few servings a month.  Lifestyle    Cook and eat meals together with your family, when possible.  Drink enough fluid to keep your urine pale yellow.  Be physically active every day. This includes:  Aerobic exercise like running or swimming.  Leisure activities like gardening, walking, or housework.  Get 7-8 hours of sleep each night.  If recommended by your health care provider, drink red wine in moderation. This means 1 glass a day for nonpregnant women and 2 glasses a day for men. A glass of wine equals 5 oz (150 mL).  What foods should I eat?  Fruits  Apples. Apricots. Avocado. Berries. Bananas. Cherries. Dates. Figs. Grapes. Polly. Melon. Oranges. Peaches. Plums. Pomegranate.  Vegetables  Artichokes. Beets. Broccoli. Cabbage. Carrots. Eggplant. Green beans. Chard. Kale. Spinach. Onions. Leeks. Peas. Squash. Tomatoes. Peppers.  Radishes.  Grains  Whole-grain pasta. Brown rice. Bulgur wheat. Polenta. Couscous. Whole-wheat bread. Oatmeal. Quinoa.  Meats and other proteins  Beans. Almonds. Sunflower seeds. Pine nuts. Peanuts. Cod. Mackey. Scallops. Shrimp. Tuna. Tilapia. Clams. Oysters. Eggs. Poultry without skin.  Dairy  Low-fat milk. Cheese. Greek yogurt.  Fats and oils  Extra-virgin olive oil. Avocado oil. Grapeseed oil.  Beverages  Water. Red wine. Herbal tea.  Sweets and desserts  Greek yogurt with honey. Baked apples. Poached pears. Trail mix.  Seasonings and condiments  Basil. Cilantro. Coriander. Cumin. Mint. Parsley. Andrey. Rosemary. Tarragon. Garlic. Oregano. Thyme. Pepper. Balsamic vinegar. Tahini. Hummus. Tomato sauce. Olives. Mushrooms.  The items listed above may not be a complete list of foods and beverages you can eat. Contact a dietitian for more information.  What foods should I limit?  This is a list of foods that should be eaten rarely or only on special occasions.  Fruits  Fruit canned in syrup.  Vegetables  Deep-fried potatoes (french fries).  Grains  Prepackaged pasta or rice dishes. Prepackaged cereal with added sugar. Prepackaged snacks with added sugar.  Meats and other proteins  Beef. Pork. Lamb. Poultry with skin. Hot dogs. Crump.  Dairy  Ice cream. Sour cream. Whole milk.  Fats and oils  Butter. Canola oil. Vegetable oil. Beef fat (tallow). Lard.  Beverages  Juice. Sugar-sweetened soft drinks. Beer. Liquor and spirits.  Sweets and desserts  Cookies. Cakes. Pies. Candy.  Seasonings and condiments  Mayonnaise. Pre-made sauces and marinades.  The items listed above may not be a complete list of foods and beverages you should limit. Contact a dietitian for more information.  Summary  The Mediterranean diet includes both food and lifestyle choices.  Eat a variety of fresh fruits and vegetables, beans, nuts, seeds, and whole grains.  Limit the amount of red meat and sweets that you eat.  If recommended by your health  care provider, drink red wine in moderation. This means 1 glass a day for nonpregnant women and 2 glasses a day for men. A glass of wine equals 5 oz (150 mL).  This information is not intended to replace advice given to you by your health care provider. Make sure you discuss any questions you have with your health care provider.  Document Revised: 01/22/2021 Document Reviewed: 11/19/2020  Elsevier Patient Education © 2023 Elsevier Inc.

## 2024-10-29 NOTE — LETTER
2024     Deepa Vega MD  [address] [address]    Patient: Jaskaran Simpson  YOB: 1960  Date of Visit: 10/29/2024      Dear Dr. Vega:    Thank you for referring Jaskaran Simpson to me for evaluation. Below are my notes for this consultation.    If you have questions, please do not hesitate to call me. I look forward to following your patient along with you.         Sincerely,        Nestor Can MD        CC: No Recipients    Nestor Can MD  10/30/2024 10:04 AM  Sign when Signing Visit     Cardiology Note       Reason for visit: Coronary artery calcification.      Jaskaran  returns today for follow-up.  He tells me that he has not been good with his diet or exercising and with that has gained 15 pounds since his last visit.    He tells me that his blood pressure has been elevated and today in the office was 150/90.  He can tell when his blood pressure is high as he gets a tense feeling throughout his body.  He has not had any chest discomfort, shortness of breath, palpitations or dizziness.    There has not been any neurologic or TIA-like symptoms.    No recent hospitalizations or emergency room visits.  He has not returned to smoking.    EKG today in the office revealed normal sinus rhythm and was normal.        Past Medical History:   Diagnosis Date   • Agatston coronary artery calcium score greater than 400 2024   • Ascending aorta dilatation (CMS/HCC) 2024   • Hypertension    • Mixed hyperlipidemia 2009     Past Surgical History   Procedure Laterality Date   • Knee surgery       Social History     Tobacco Use   • Smoking status: Former     Current packs/day: 0.00     Average packs/day: 0.5 packs/day for 20.0 years (10.0 ttl pk-yrs)     Types: Cigarettes     Start date: 2004     Quit date: 2024     Years since quittin.6   • Smokeless tobacco: Never   Substance Use Topics   • Alcohol use: Not Currently      Comment: 2 drinks per day- stopped drinking 2/3/2024   • Drug use: Not Currently      Family History   Problem Relation Name Age of Onset   • Heart attack Biological Mother          passed away when she was 77   • Hypertension Biological Mother     • Hyperlipidemia Biological Mother     • Colon cancer Biological Father     • Heart attack Biological Father     • Prostate cancer Neg Hx       Patient has no known allergies.  Current Outpatient Medications   Medication Instructions   • aspirin 81 mg, Daily   • ezetimibe (ZETIA) 10 mg, oral, Nightly   • rosuvastatin (CRESTOR) 20 mg, oral, Daily   • valsartan (DIOVAN) 40 mg, oral, Daily          Review of Systems   Cardiovascular:  Negative for chest pain, dyspnea on exertion, irregular heartbeat, leg swelling, near-syncope, orthopnea, palpitations, paroxysmal nocturnal dyspnea and syncope.   Neurological:  Negative for dizziness and light-headedness.      Objective    Vitals:    10/29/24 1457   BP: (!) 150/90   Pulse:    SpO2:       Wt Readings from Last 3 Encounters:   10/29/24 91.6 kg (202 lb)   09/16/24 84.8 kg (187 lb)   05/03/24 85.1 kg (187 lb 9.6 oz)      Physical Exam  Neck:      Vascular: No carotid bruit or JVD.   Cardiovascular:      Rate and Rhythm: Normal rate and regular rhythm.      Pulses:           Carotid pulses are 2+ on the right side and 2+ on the left side.       Dorsalis pedis pulses are 2+ on the right side and 2+ on the left side.        Posterior tibial pulses are 2+ on the right side and 2+ on the left side.      Heart sounds: S1 normal and S2 normal. No murmur heard.     No friction rub. No gallop.   Pulmonary:      Effort: Pulmonary effort is normal.      Breath sounds: Normal breath sounds.   Musculoskeletal:      Right lower leg: No edema.      Left lower leg: No edema.   Skin:     General: Skin is warm and dry.   Neurological:      Mental Status: He is alert.   Psychiatric:         Behavior: Behavior normal.                 CT coronary  angiogram 4/16/2024.  CORONARY ARTERIES     LEFT MAIN: Arises from the left coronary cusp and bifurcates into a left  anterior descending and left circumflex arteries.  Calcified plaque results in  minimal (<25%) stenosis.     LAD: Arises from the left main coronary artery and gives rise to three diagonal  branches before terminating at the left ventricular apex.  There is calcified  plaque in the proximal to mid vessel around the area of the first diagonal which  results in moderate (70%) stenosis that is borderline hemodynamically  significant with a drop of CT FFR value of 0.95 to 0.83 after the lesion.  The  remainder of the vessel slowly tapers with a slowly decreasing CT FFR down to  0.69 at the left ventricular apex.  Diagonal branches are small in size and  appear patent.     LCX: Arises from the left main coronary artery and immediately bifurcates into  the first obtuse marginal before coursing inferolaterally in the left  atrioventricular groove and giving rise to a second obtuse marginal branch  before terminating as a diminutive vessel.  There is calcified plaque at the  vessel ostium and the mid vessel that results in minimal (<25%) stenosis.  There  is calcified plaque in the first obtuse marginal that results in minimal (<25%)  stenosis.  There is calcified plaque in the second obtuse marginal that results  in minimal (<25%) stenosis.     RCA: Arises from the right coronary cusp and courses in the right  atrioventricular groove before wrapping around the inferior aspect of the heart  and giving rise to right posterior descending artery and right posterior lateral  branch.  There is calcified plaque in the proximal vessel that results in  minimal (<25%) stenosis.  There is noncalcified plaque and mixed etiology plaque  in the distal vessel which results in mild (<50 %) stenosis. The posterior  descending artery and posterior lateral branches appear patent.     CORONARY CALCIUM COMPOSITE AGATSTON SCORE:  1978  LM: 518    LAD: 637    LCX: 224    RCA: 599  This places the patient in the SORIA 98th risk percentile for age and gender  matched individuals.     SEGMENT INVOLVEMENT SCORE:  TOTAL: 8    LM: 1    LAD: 2    LCX: 3    RCA: 2     CARDIAC MORPHOLOGY     LEFT VENTRICLE: Normal in appearance.  LEFT ATRIUM: Mildly dilated.  LEFT ATRIAL APPENDAGE: Normal in appearance.  RIGHT VENTRICLE: Normal in appearance.  RIGHT ATRIUM: Normal.  AORTIC VALVE: Appears tricuspid.  MITRAL VALVE: Normal in appearance.  TRICUSPID VALVE: Not well visualized.  PULMONIC VALVE: Not well visualized.     PERICARDIUM: Normal in appearance.     AORTA     AORTIC ROOT: Mild calcification.  SINUS OF VALSALVA: Maximal diameter 39 mm.  ASCENDING THORACIC AORTA: Maximal diameter 41 mm.     SUMMARY:  1.  Multivessel, borderline obstructive coronary artery disease. There is  calcified plaque in the proximal to mid vessel around the area of the first  diagonal which results in moderate (50-70%) that is borderline hemodynamically  significant with a drop of CT FFR value of 0.95 to 0.83 after the lesion.  The  remainder of the vessel slowly tapers with a slowly decreasing CT FFR down to  0.69 at the left ventricular apex.  The remainder of the epicardial vessels  exhibit mild (<50%) stenosis.  2.  Normal cardiac morphology, imaged in diastole.  3.  Normal aortic root size with a maximal diameter of 39 mm at the sinuses of  Valsalva.  Mildly dilated ascending thoracic aorta measuring 41 mm.         ECG.  Normal sinus rhythm.  Normal EKG.     Assessment/Plan    Essential hypertension, benign  I told Jaskaran that he needs to be on medication for his blood pressure.  I told him if he loses weight we may be able to reduce that or stop the medication but for now given his blood pressure readings and his symptoms he will need medication to control it.    I have started him on valsartan 40 mg daily.  He will return in 2 weeks for a follow-up blood pressure  check.    Mixed hyperlipidemia  Lipids done last week revealed a total cholesterol of 177, triglycerides 96, HDL was 86 and LDL was 74.    His LDL is improved compared to January which at that time was 179.  However, given his markedly elevated coronary artery calcium score of 1978 I want his LDL down below 70 and I have added ezetimibe 10 mg daily to his rosuvastatin 20 mg daily.    I have counseled the need for diet, exercise and weight loss.  He will get repeat blood work in 3 months.    Ascending aorta dilatation (CMS/HCC)  Mild dilatation of his ascending aorta.  I will continue to follow him clinically and with serial imaging studies.  By CTA it measured 4.1 cm.    I discussed with him the ways to reduce the risk of aortic growth including maintaining a blood pressure of less than 130/80, abstinence from smoking and avoiding heavy lifting.    Agatston coronary artery calcium score greater than 400  CT coronary angiography did not reveal any obstructive lesions.  There was moderate disease and a first diagonal branch.  He is asymptomatic.    He will continue on the aspirin, rosuvastatin and Zetia.  Counseled on diet, exercise and weight loss.    We had a prolonged discussion about these complex clinical issues and went over the various important aspects to consider. All questions were answered.    Jaskaran will be coming due for his day-to-day care.  He will return in 2 weeks for follow-up blood pressure check.  I will continue to keep you informed of his progress.  He will call with any questions or concerns in the interim.  I reviewed with him the potential side effects of the medications.            Thank you for allowing me to participate in the care of this patient.  If you have any questions please don't hesitate to contact me.    I spent 35 minutes on this date of service performing the following activities: obtaining history, performing examination, entering orders, documenting, preparing for visit,  obtaining / reviewing records, providing counseling and education and communicating results.     Nestor Can MD [facility]   10/30/2024  10:03 AM

## 2024-10-29 NOTE — PROGRESS NOTES
Cardiology Note       Reason for visit: Coronary artery calcification.      Jaskaran  returns today for follow-up.  He tells me that he has not been good with his diet or exercising and with that has gained 15 pounds since his last visit.    He tells me that his blood pressure has been elevated and today in the office was 150/90.  He can tell when his blood pressure is high as he gets a tense feeling throughout his body.  He has not had any chest discomfort, shortness of breath, palpitations or dizziness.    There has not been any neurologic or TIA-like symptoms.    No recent hospitalizations or emergency room visits.  He has not returned to smoking.    EKG today in the office revealed normal sinus rhythm and was normal.        Past Medical History:   Diagnosis Date    Agatston coronary artery calcium score greater than 400 2024    Ascending aorta dilatation (CMS/HCC) 2024    Hypertension     Mixed hyperlipidemia 2009     Past Surgical History   Procedure Laterality Date    Knee surgery       Social History     Tobacco Use    Smoking status: Former     Current packs/day: 0.00     Average packs/day: 0.5 packs/day for 20.0 years (10.0 ttl pk-yrs)     Types: Cigarettes     Start date: 2004     Quit date: 2024     Years since quittin.6    Smokeless tobacco: Never   Substance Use Topics    Alcohol use: Not Currently     Comment: 2 drinks per day- stopped drinking 2/3/2024    Drug use: Not Currently      Family History   Problem Relation Name Age of Onset    Heart attack Biological Mother          passed away when she was 77    Hypertension Biological Mother      Hyperlipidemia Biological Mother      Colon cancer Biological Father      Heart attack Biological Father      Prostate cancer Neg Hx       Patient has no known allergies.  Current Outpatient Medications   Medication Instructions    aspirin 81 mg, Daily    ezetimibe (ZETIA) 10 mg, oral, Nightly    rosuvastatin (CRESTOR) 20 mg, oral,  Daily    valsartan (DIOVAN) 40 mg, oral, Daily          Review of Systems   Cardiovascular:  Negative for chest pain, dyspnea on exertion, irregular heartbeat, leg swelling, near-syncope, orthopnea, palpitations, paroxysmal nocturnal dyspnea and syncope.   Neurological:  Negative for dizziness and light-headedness.      Objective    Vitals:    10/29/24 1457   BP: (!) 150/90   Pulse:    SpO2:       Wt Readings from Last 3 Encounters:   10/29/24 91.6 kg (202 lb)   09/16/24 84.8 kg (187 lb)   05/03/24 85.1 kg (187 lb 9.6 oz)      Physical Exam  Neck:      Vascular: No carotid bruit or JVD.   Cardiovascular:      Rate and Rhythm: Normal rate and regular rhythm.      Pulses:           Carotid pulses are 2+ on the right side and 2+ on the left side.       Dorsalis pedis pulses are 2+ on the right side and 2+ on the left side.        Posterior tibial pulses are 2+ on the right side and 2+ on the left side.      Heart sounds: S1 normal and S2 normal. No murmur heard.     No friction rub. No gallop.   Pulmonary:      Effort: Pulmonary effort is normal.      Breath sounds: Normal breath sounds.   Musculoskeletal:      Right lower leg: No edema.      Left lower leg: No edema.   Skin:     General: Skin is warm and dry.   Neurological:      Mental Status: He is alert.   Psychiatric:         Behavior: Behavior normal.                 CT coronary angiogram 4/16/2024.  CORONARY ARTERIES     LEFT MAIN: Arises from the left coronary cusp and bifurcates into a left  anterior descending and left circumflex arteries.  Calcified plaque results in  minimal (<25%) stenosis.     LAD: Arises from the left main coronary artery and gives rise to three diagonal  branches before terminating at the left ventricular apex.  There is calcified  plaque in the proximal to mid vessel around the area of the first diagonal which  results in moderate (70%) stenosis that is borderline hemodynamically  significant with a drop of CT FFR value of 0.95 to 0.83  after the lesion.  The  remainder of the vessel slowly tapers with a slowly decreasing CT FFR down to  0.69 at the left ventricular apex.  Diagonal branches are small in size and  appear patent.     LCX: Arises from the left main coronary artery and immediately bifurcates into  the first obtuse marginal before coursing inferolaterally in the left  atrioventricular groove and giving rise to a second obtuse marginal branch  before terminating as a diminutive vessel.  There is calcified plaque at the  vessel ostium and the mid vessel that results in minimal (<25%) stenosis.  There  is calcified plaque in the first obtuse marginal that results in minimal (<25%)  stenosis.  There is calcified plaque in the second obtuse marginal that results  in minimal (<25%) stenosis.     RCA: Arises from the right coronary cusp and courses in the right  atrioventricular groove before wrapping around the inferior aspect of the heart  and giving rise to right posterior descending artery and right posterior lateral  branch.  There is calcified plaque in the proximal vessel that results in  minimal (<25%) stenosis.  There is noncalcified plaque and mixed etiology plaque  in the distal vessel which results in mild (<50 %) stenosis. The posterior  descending artery and posterior lateral branches appear patent.     CORONARY CALCIUM COMPOSITE AGATSTON SCORE: 1978  LM: 518    LAD: 637    LCX: 224    RCA: 599  This places the patient in the SORIA 98th risk percentile for age and gender  matched individuals.     SEGMENT INVOLVEMENT SCORE:  TOTAL: 8    LM: 1    LAD: 2    LCX: 3    RCA: 2     CARDIAC MORPHOLOGY     LEFT VENTRICLE: Normal in appearance.  LEFT ATRIUM: Mildly dilated.  LEFT ATRIAL APPENDAGE: Normal in appearance.  RIGHT VENTRICLE: Normal in appearance.  RIGHT ATRIUM: Normal.  AORTIC VALVE: Appears tricuspid.  MITRAL VALVE: Normal in appearance.  TRICUSPID VALVE: Not well visualized.  PULMONIC VALVE: Not well visualized.      PERICARDIUM: Normal in appearance.     AORTA     AORTIC ROOT: Mild calcification.  SINUS OF VALSALVA: Maximal diameter 39 mm.  ASCENDING THORACIC AORTA: Maximal diameter 41 mm.     SUMMARY:  1.  Multivessel, borderline obstructive coronary artery disease. There is  calcified plaque in the proximal to mid vessel around the area of the first  diagonal which results in moderate (50-70%) that is borderline hemodynamically  significant with a drop of CT FFR value of 0.95 to 0.83 after the lesion.  The  remainder of the vessel slowly tapers with a slowly decreasing CT FFR down to  0.69 at the left ventricular apex.  The remainder of the epicardial vessels  exhibit mild (<50%) stenosis.  2.  Normal cardiac morphology, imaged in diastole.  3.  Normal aortic root size with a maximal diameter of 39 mm at the sinuses of  Valsalva.  Mildly dilated ascending thoracic aorta measuring 41 mm.         ECG.  Normal sinus rhythm.  Normal EKG.     Assessment/Plan    Essential hypertension, benign  I told Jaskaran that he needs to be on medication for his blood pressure.  I told him if he loses weight we may be able to reduce that or stop the medication but for now given his blood pressure readings and his symptoms he will need medication to control it.    I have started him on valsartan 40 mg daily.  He will return in 2 weeks for a follow-up blood pressure check.    Mixed hyperlipidemia  Lipids done last week revealed a total cholesterol of 177, triglycerides 96, HDL was 86 and LDL was 74.    His LDL is improved compared to January which at that time was 179.  However, given his markedly elevated coronary artery calcium score of 1978 I want his LDL down below 70 and I have added ezetimibe 10 mg daily to his rosuvastatin 20 mg daily.    I have counseled the need for diet, exercise and weight loss.  He will get repeat blood work in 3 months.    Ascending aorta dilatation (CMS/HCC)  Mild dilatation of his ascending aorta.  I will continue  to follow him clinically and with serial imaging studies.  By CTA it measured 4.1 cm.    I discussed with him the ways to reduce the risk of aortic growth including maintaining a blood pressure of less than 130/80, abstinence from smoking and avoiding heavy lifting.    Agatston coronary artery calcium score greater than 400  CT coronary angiography did not reveal any obstructive lesions.  There was moderate disease and a first diagonal branch.  He is asymptomatic.    He will continue on the aspirin, rosuvastatin and Zetia.  Counseled on diet, exercise and weight loss.    We had a prolonged discussion about these complex clinical issues and went over the various important aspects to consider. All questions were answered.    Jaskaran will be coming due for his day-to-day care.  He will return in 2 weeks for follow-up blood pressure check.  I will continue to keep you informed of his progress.  He will call with any questions or concerns in the interim.  I reviewed with him the potential side effects of the medications.            Thank you for allowing me to participate in the care of this patient.  If you have any questions please don't hesitate to contact me.    I spent 35 minutes on this date of service performing the following activities: obtaining history, performing examination, entering orders, documenting, preparing for visit, obtaining / reviewing records, providing counseling and education and communicating results.     Nestor Can MD [facility]   10/30/2024  10:03 AM

## 2024-10-30 NOTE — ASSESSMENT & PLAN NOTE
CT coronary angiography did not reveal any obstructive lesions.  There was moderate disease and a first diagonal branch.  He is asymptomatic.    He will continue on the aspirin, rosuvastatin and Zetia.  Counseled on diet, exercise and weight loss.

## 2024-10-30 NOTE — ASSESSMENT & PLAN NOTE
Mild dilatation of his ascending aorta.  I will continue to follow him clinically and with serial imaging studies.  By CTA it measured 4.1 cm.    I discussed with him the ways to reduce the risk of aortic growth including maintaining a blood pressure of less than 130/80, abstinence from smoking and avoiding heavy lifting.

## 2024-10-30 NOTE — ASSESSMENT & PLAN NOTE
Lipids done last week revealed a total cholesterol of 177, triglycerides 96, HDL was 86 and LDL was 74.    His LDL is improved compared to January which at that time was 179.  However, given his markedly elevated coronary artery calcium score of 1978 I want his LDL down below 70 and I have added ezetimibe 10 mg daily to his rosuvastatin 20 mg daily.    I have counseled the need for diet, exercise and weight loss.  He will get repeat blood work in 3 months.

## 2024-10-30 NOTE — ASSESSMENT & PLAN NOTE
I told Jaskaran that he needs to be on medication for his blood pressure.  I told him if he loses weight we may be able to reduce that or stop the medication but for now given his blood pressure readings and his symptoms he will need medication to control it.    I have started him on valsartan 40 mg daily.  He will return in 2 weeks for a follow-up blood pressure check.

## 2024-11-12 ENCOUNTER — TELEPHONE (OUTPATIENT)
Dept: CARDIOLOGY | Facility: CLINIC | Age: 64
End: 2024-11-12

## 2024-11-12 NOTE — TELEPHONE ENCOUNTER
Patient came in for blood oressure chek.   Reviewed medication list with patient- all meds in the chart are correct with doeses in the chart.    B/P measured on R/A: 128/80    B/p measured on LA: 132/80      Heart Rate: 83    Pulse ox: 98

## 2024-11-22 ENCOUNTER — OFFICE VISIT (OUTPATIENT)
Dept: FAMILY MEDICINE | Facility: CLINIC | Age: 64
End: 2024-11-22
Payer: COMMERCIAL

## 2024-11-22 VITALS
OXYGEN SATURATION: 99 % | DIASTOLIC BLOOD PRESSURE: 80 MMHG | TEMPERATURE: 97.1 F | HEART RATE: 61 BPM | WEIGHT: 192.2 LBS | HEIGHT: 70 IN | BODY MASS INDEX: 27.52 KG/M2 | SYSTOLIC BLOOD PRESSURE: 180 MMHG

## 2024-11-22 DIAGNOSIS — R74.8 ELEVATED LIVER ENZYMES: ICD-10-CM

## 2024-11-22 DIAGNOSIS — I10 ESSENTIAL HYPERTENSION, BENIGN: Primary | ICD-10-CM

## 2024-11-22 DIAGNOSIS — R73.01 IMPAIRED FASTING GLUCOSE: ICD-10-CM

## 2024-11-22 DIAGNOSIS — F10.90 ALCOHOL USE DISORDER: ICD-10-CM

## 2024-11-22 DIAGNOSIS — Z12.5 PROSTATE CANCER SCREENING: ICD-10-CM

## 2024-11-22 DIAGNOSIS — F41.1 GENERALIZED ANXIETY DISORDER: ICD-10-CM

## 2024-11-22 DIAGNOSIS — I25.10 CORONARY ARTERY DISEASE INVOLVING NATIVE CORONARY ARTERY OF NATIVE HEART WITHOUT ANGINA PECTORIS: ICD-10-CM

## 2024-11-22 PROCEDURE — 3077F SYST BP >= 140 MM HG: CPT | Performed by: FAMILY MEDICINE

## 2024-11-22 PROCEDURE — 3008F BODY MASS INDEX DOCD: CPT | Performed by: FAMILY MEDICINE

## 2024-11-22 PROCEDURE — 99214 OFFICE O/P EST MOD 30 MIN: CPT | Performed by: FAMILY MEDICINE

## 2024-11-22 PROCEDURE — 3079F DIAST BP 80-89 MM HG: CPT | Performed by: FAMILY MEDICINE

## 2024-11-22 NOTE — PROGRESS NOTES
"Subjective    Jaskaran Simpson is a 64 y.o. male presenting today for: Follow-up      HPI:   Here to f/u on alcohol use.  Stopped drinking 24 days ago.  Working with a therapist. He has been seeing him weekly.  No withdrawal symptoms.   He went to AA in the past. \"It wasn't for me.\"     HTN: Reports good compliance with anti hypertensives.    Health Maintenance Due   Topic Date Due    HIV Screening  Never done    Influenza Vaccine (1) 08/01/2024    COVID-19 Vaccine (4 - 2024-25 season) 09/01/2024         Patient Active Problem List   Diagnosis    Essential hypertension, benign    Family hx of colon cancer    Mixed hyperlipidemia    Cervical radiculitis    Routine health maintenance    Agatston coronary artery calcium score greater than 400    Ascending aorta dilatation (CMS/HCC)    Coronary artery disease involving native coronary artery of native heart without angina pectoris    Alcohol use disorder    Generalized anxiety disorder          Current Outpatient Medications:     aspirin 81 mg enteric coated tablet, Take 81 mg by mouth daily., Disp: , Rfl:     ezetimibe (ZETIA) 10 mg tablet, Take 1 tablet (10 mg total) by mouth nightly., Disp: 90 tablet, Rfl: 3    rosuvastatin (CRESTOR) 20 mg tablet, Take 1 tablet (20 mg total) by mouth daily., Disp: 90 tablet, Rfl: 3    valsartan (DIOVAN) 40 mg tablet, Take 1 tablet (40 mg total) by mouth daily., Disp: 90 tablet, Rfl: 3     No Known Allergies        Review of Systems     Objective  Visit Vitals  BP (!) 180/80 (BP Location: Left upper arm, Patient Position: Sitting)   Pulse 61   Temp 36.2 °C (97.1 °F) (Temporal)   Ht 1.778 m (5' 10\")   Wt 87.2 kg (192 lb 3.2 oz)   SpO2 99%   BMI 27.58 kg/m²    Body mass index is 27.58 kg/m².    Wt Readings from Last 3 Encounters:   11/22/24 87.2 kg (192 lb 3.2 oz)   10/29/24 91.6 kg (202 lb)   09/16/24 84.8 kg (187 lb)       BP Readings from Last 3 Encounters:   11/22/24 (!) 180/80   10/29/24 (!) 150/90   09/16/24 130/88          "   Physical Exam  Vitals reviewed.   Constitutional:       Appearance: Normal appearance. He is not ill-appearing.   HENT:      Head: Normocephalic and atraumatic.      Right Ear: External ear normal.      Left Ear: External ear normal.   Eyes:      Conjunctiva/sclera: Conjunctivae normal.      Pupils: Pupils are equal, round, and reactive to light.   Cardiovascular:      Rate and Rhythm: Normal rate and regular rhythm.      Heart sounds: No murmur heard.  Pulmonary:      Effort: Pulmonary effort is normal.      Breath sounds: Normal breath sounds. No wheezing, rhonchi or rales.   Skin:     General: Skin is warm and dry.   Neurological:      General: No focal deficit present.      Mental Status: He is alert and oriented to person, place, and time.   Psychiatric:         Mood and Affect: Mood normal.         Behavior: Behavior normal.          Collected Updated Procedure    10/24/2024 0829 10/25/2024 0205 Lipid panel [[id number]]    Blood, Venous    Component Value Units   Cholesterol, Total 177 mg/dL   Triglycerides 96 mg/dL   HDL Cholesterol 86 mg/dL   VLDL Cholesterol [place] 17 mg/dL   LDL Cholesterol Calc 74 mg/dL          10/24/2024 0829 10/25/2024 0205 Comprehensive metabolic panel [[id number]]    (Abnormal)   Blood, Venous    Component Value Units   Glucose, Serum 117 High  mg/dL   BUN 18 mg/dL   Creatinine, Serum 1.09 mg/dL   eGFR 76 mL/min/1.73   BUN/Creatinine Ratio 17    Sodium, Serum 139 mmol/L   Potassium, Serum 4.3 mmol/L   Chloride, Serum 101 mmol/L   Carbon Dioxide, Total 23 mmol/L   Calcium, Serum 9.4 mg/dL   Protein, Total, Serum 7.1 g/dL   Albumin, Serum 5.0 High  g/dL   Globulin, Total 2.1 g/dL   Bilirubin, Total 0.9 mg/dL   Alkaline Phosphatase, S 61 IU/L   AST (SGOT) 24 IU/L   ALT (SGPT) 47 High  IU/L             Assessment/Plan  Problem List Items Addressed This Visit       Essential hypertension, benign - Primary     Significantly elevated here.   Normal on recheck at cardiology office 2 weeks  ago.  Restart home monitoring.  He will send me recordings through portal in 1-2 weeks.         Coronary artery disease involving native coronary artery of native heart without angina pectoris     Continue asa, statin. Zetia.           Alcohol use disorder     Congratulated him on quitting.  He feels well supported with therapist.   Declines AA any other treatment at this time.  He knows he can reach out to me for anything.         Generalized anxiety disorder     Doing better with therapy.          Other Visit Diagnoses       Impaired fasting glucose        Relevant Orders    Hemoglobin A1c    Prostate cancer screening        Relevant Orders    PSA    Elevated liver enzymes        Relevant Orders    Comprehensive metabolic panel               Return in about 4 months (around 3/22/2025).     Deepa Vega MD

## 2024-11-22 NOTE — ASSESSMENT & PLAN NOTE
Significantly elevated here.   Normal on recheck at cardiology office 2 weeks ago.  Restart home monitoring.  He will send me recordings through portal in 1-2 weeks.

## 2024-11-22 NOTE — ASSESSMENT & PLAN NOTE
Congratulated him on quitting.  He feels well supported with therapist.   Declines AA any other treatment at this time.  He knows he can reach out to me for anything.

## 2025-04-26 LAB
ALBUMIN SERPL-MCNC: 4.8 G/DL (ref 3.9–4.9)
ALP SERPL-CCNC: 61 IU/L (ref 44–121)
ALT SERPL-CCNC: 24 IU/L (ref 0–44)
AST SERPL-CCNC: 19 IU/L (ref 0–40)
BILIRUB SERPL-MCNC: 1 MG/DL (ref 0–1.2)
BUN SERPL-MCNC: 15 MG/DL (ref 8–27)
BUN/CREAT SERPL: 15 (ref 10–24)
CALCIUM SERPL-MCNC: 9.4 MG/DL (ref 8.6–10.2)
CHLORIDE SERPL-SCNC: 100 MMOL/L (ref 96–106)
CHOLEST SERPL-MCNC: 110 MG/DL (ref 100–199)
CO2 SERPL-SCNC: 17 MMOL/L (ref 20–29)
CREAT SERPL-MCNC: 1.02 MG/DL (ref 0.76–1.27)
EGFRCR SERPLBLD CKD-EPI 2021: 83 ML/MIN/1.73
GLOBULIN SER CALC-MCNC: 2.2 G/DL (ref 1.5–4.5)
GLUCOSE SERPL-MCNC: 84 MG/DL (ref 70–99)
HBA1C MFR BLD: 5.8 % (ref 4.8–5.6)
HDLC SERPL-MCNC: 66 MG/DL
LDLC SERPL CALC-MCNC: 30 MG/DL (ref 0–99)
POTASSIUM SERPL-SCNC: 4.4 MMOL/L (ref 3.5–5.2)
PROT SERPL-MCNC: 7 G/DL (ref 6–8.5)
PSA SERPL-MCNC: 1.9 NG/ML (ref 0–4)
SODIUM SERPL-SCNC: 138 MMOL/L (ref 134–144)
TRIGL SERPL-MCNC: 63 MG/DL (ref 0–149)
VLDLC SERPL CALC-MCNC: 14 MG/DL (ref 5–40)

## 2025-04-28 ENCOUNTER — RESULTS FOLLOW-UP (OUTPATIENT)
Dept: FAMILY MEDICINE | Facility: CLINIC | Age: 65
End: 2025-04-28

## 2025-04-28 NOTE — PROGRESS NOTES
Cardiology Note       Reason for visit: Coronary artery disease.      Jaksaran returns today for follow-up.  He tells me he has been doing well without any chest discomfort or shortness of breath.  There has not been any dizziness, near-syncope or syncope.  He does not like taking the medications.  He continues to be active and has not had any effort related symptoms.    There has not been any recent hospitalizations or emergency room visits.    On exam in the office, his blood pressure was 160/90 and had been elevated when he saw you in the office.    EKG revealed sinus bradycardia but was otherwise normal.    Jaskaran had a CT coronary angiogram 1 year ago which revealed severe coronary artery calcification with total CAC score of 1978.  There was multivessel coronary disease but it was nonobstructive.    Clinically, he has not had any anginal symptoms.        Past Medical History:   Diagnosis Date    Agatston coronary artery calcium score greater than 400 2024    Ascending aorta dilatation (CMS/HCC) 2024    Hypertension     Mixed hyperlipidemia 2009     Past Surgical History   Procedure Laterality Date    Inguinal hernia repair      Knee surgery       Social History     Tobacco Use    Smoking status: Former     Current packs/day: 0.00     Average packs/day: 0.5 packs/day for 20.0 years (10.0 ttl pk-yrs)     Types: Cigarettes     Start date: 2004     Quit date: 2024     Years since quittin.1    Smokeless tobacco: Never   Vaping Use    Vaping status: Never Used   Substance Use Topics    Alcohol use: Yes     Alcohol/week: 8.0 standard drinks of alcohol     Types: 8 Standard drinks or equivalent per week    Drug use: Not Currently      Family History   Problem Relation Name Age of Onset    Heart attack Biological Mother          passed away when she was 77    Hypertension Biological Mother      Hyperlipidemia Biological Mother      Colon cancer Biological Father      Heart attack  Biological Father      Prostate cancer Neg Hx       Patient has no known allergies.  Current Outpatient Medications   Medication Instructions    aspirin 81 mg, Daily    rosuvastatin (CRESTOR) 20 mg, oral, Daily    valsartan (DIOVAN) 80 mg, oral, Daily          Review of Systems   Cardiovascular:  Negative for chest pain, dyspnea on exertion, irregular heartbeat, leg swelling, near-syncope, orthopnea, palpitations, paroxysmal nocturnal dyspnea and syncope.   Neurological:  Negative for dizziness and light-headedness.      Objective    Vitals:    04/29/25 1533   BP: (!) 160/90   Pulse: 65   SpO2: 98%      Wt Readings from Last 3 Encounters:   04/29/25 88.9 kg (196 lb)   04/29/25 89.4 kg (197 lb 3.2 oz)   11/22/24 87.2 kg (192 lb 3.2 oz)      Physical Exam  Neck:      Vascular: No carotid bruit or JVD.   Cardiovascular:      Rate and Rhythm: Normal rate and regular rhythm.      Pulses:           Carotid pulses are 2+ on the right side and 2+ on the left side.       Dorsalis pedis pulses are 2+ on the right side and 2+ on the left side.        Posterior tibial pulses are 2+ on the right side and 2+ on the left side.      Heart sounds: S1 normal and S2 normal. No murmur heard.     No friction rub. No gallop.   Pulmonary:      Effort: Pulmonary effort is normal.      Breath sounds: Normal breath sounds.   Musculoskeletal:      Right lower leg: No edema.      Left lower leg: No edema.   Skin:     General: Skin is warm and dry.   Neurological:      Mental Status: He is alert.   Psychiatric:         Behavior: Behavior normal.                                 CT coronary angiogram 4/16/2024.  CORONARY ARTERIES     LEFT MAIN: Arises from the left coronary cusp and bifurcates into a left  anterior descending and left circumflex arteries.  Calcified plaque results in  minimal (<25%) stenosis.     LAD: Arises from the left main coronary artery and gives rise to three diagonal  branches before terminating at the left ventricular  apex.  There is calcified  plaque in the proximal to mid vessel around the area of the first diagonal which  results in moderate (70%) stenosis that is borderline hemodynamically  significant with a drop of CT FFR value of 0.95 to 0.83 after the lesion.  The  remainder of the vessel slowly tapers with a slowly decreasing CT FFR down to  0.69 at the left ventricular apex.  Diagonal branches are small in size and  appear patent.     LCX: Arises from the left main coronary artery and immediately bifurcates into  the first obtuse marginal before coursing inferolaterally in the left  atrioventricular groove and giving rise to a second obtuse marginal branch  before terminating as a diminutive vessel.  There is calcified plaque at the  vessel ostium and the mid vessel that results in minimal (<25%) stenosis.  There  is calcified plaque in the first obtuse marginal that results in minimal (<25%)  stenosis.  There is calcified plaque in the second obtuse marginal that results  in minimal (<25%) stenosis.     RCA: Arises from the right coronary cusp and courses in the right  atrioventricular groove before wrapping around the inferior aspect of the heart  and giving rise to right posterior descending artery and right posterior lateral  branch.  There is calcified plaque in the proximal vessel that results in  minimal (<25%) stenosis.  There is noncalcified plaque and mixed etiology plaque  in the distal vessel which results in mild (<50 %) stenosis. The posterior  descending artery and posterior lateral branches appear patent.     CORONARY CALCIUM COMPOSITE AGATSTON SCORE: 1978  LM: 518    LAD: 637    LCX: 224    RCA: 599  This places the patient in the SORIA 98th risk percentile for age and gender  matched individuals.     SEGMENT INVOLVEMENT SCORE:  TOTAL: 8    LM: 1    LAD: 2    LCX: 3    RCA: 2     CARDIAC MORPHOLOGY     LEFT VENTRICLE: Normal in appearance.  LEFT ATRIUM: Mildly dilated.  LEFT ATRIAL APPENDAGE: Normal in  appearance.  RIGHT VENTRICLE: Normal in appearance.  RIGHT ATRIUM: Normal.  AORTIC VALVE: Appears tricuspid.  MITRAL VALVE: Normal in appearance.  TRICUSPID VALVE: Not well visualized.  PULMONIC VALVE: Not well visualized.     PERICARDIUM: Normal in appearance.     AORTA     AORTIC ROOT: Mild calcification.  SINUS OF VALSALVA: Maximal diameter 39 mm.  ASCENDING THORACIC AORTA: Maximal diameter 41 mm.     SUMMARY:  1.  Multivessel, borderline obstructive coronary artery disease. There is  calcified plaque in the proximal to mid vessel around the area of the first  diagonal which results in moderate (50-70%) that is borderline hemodynamically  significant with a drop of CT FFR value of 0.95 to 0.83 after the lesion.  The  remainder of the vessel slowly tapers with a slowly decreasing CT FFR down to  0.69 at the left ventricular apex.  The remainder of the epicardial vessels  exhibit mild (<50%) stenosis.  2.  Normal cardiac morphology, imaged in diastole.  3.  Normal aortic root size with a maximal diameter of 39 mm at the sinuses of  Valsalva.  Mildly dilated ascending thoracic aorta measuring 41 mm.          Exercise nuclear stress test.  3/12/2024.  This is an abnormal exercise nuclear stress test.  Below average exercise capacity.  Exercise EKG negative for ischemia or arrhythmia.  Perfusion images demonstrate a moderate to large area of moderately reduced isotope uptake in the basal and mid inferior, basal inferoseptal and basal inferolateral segments.  At rest, the basal and mid inferior defect is fixed, but there is improvement in the basal inferoseptal and basal inferolateral segments.  Although there is significant motion and adjacent GI uptake on resting images, findings are concerning for a small amount of ischemia in the territory of the right coronary artery.  LVEF 59% with grossly normal wall motion.       Transthoracic echocardiogram.  3/12/2024.    Left Ventricle: Normal ventricle size. Normal wall  thickness. Estimated EF 65-70%. No regional wall motion abnormalities. Normal diastolic filling pattern for age.    Right Ventricle: Normal ventricle size. Normal systolic function.    Left Atrium: Normal sized atrium.    Mitral Valve: Normal leaflet structure. Normal leaflet motion. Trace regurgitation. The jet is centrally directed. No stenosis.    Right Atrium: Normal sized atrium.    Aortic Valve: Tricuspid valve.  Sclerotic leaflets. No regurgitation. No stenosis.    Tricuspid Valve: Normal structure. Mild regurgitation. The regurgitation jet is central. Estimated RVSP = 25 mmHg.    Aorta:  Mild dilatation at the sinuses of Valsalva. Mild dilatation of the aortic root.  4 cm.   Mild dilatation of the ascending aorta.  4.3 cm.    No prior echocardiogram for comparison.    ECG.  Sinus bradycardia otherwise normal EKG.     Assessment/Plan    Mixed hyperlipidemia  Jaskaran had a lipid profile performed the other day which revealed a total cholesterol of 110, triglycerides 63, HDL 66 and LDL was 30.  His LDL goal is less than 70 but he is concerned about being so low and previously his LDL was 74.    I told him to remain on the rosuvastatin and to take it on a regular basis.  He can stop this ezetimibe and we will repeat his blood work in 6 months.    I counseled him on the continued need for diet and exercise.    His LDL in January 2024 was 179 and it has come down considerably.    I told him that if his LDL was not below 70 at his next evaluation then the plan will be for him to go back on the ezetimibe.    Essential hypertension, benign  Blood pressure was elevated today in the office and also 1 he had seen you.  He cannot tell when his blood pressure is elevated.    I have increased his valsartan to 80 mg daily and he will monitor his blood pressure at home.  I reviewed with him the potential side effects of the higher dosage.    Ascending aorta dilatation (CMS/HCC)  Mild dilatation of the ascending aorta  measuring 4 cm.  I will continue to follow him clinically and with serial imaging studies.    Agatston coronary artery calcium score greater than 400  CAC score 1978.  He will continue on the aspirin and statin therapy.  LDL goal is less than 70.    Jaskaran had an exercise nuclear stress test last year which was abnormal and prompted him to have CT coronary angiography which showed no significant obstruction there was a borderline stenosis in the first diagonal.    He has been asymptomatic.  He will continue on the aspirin and statin therapy.      Jaskaran will be coming due for his day-to-day care.  He will return to see me in 6 months time or sooner if there is a problem.  I will continue to keep you informed of his progress.  He will call with any questions or concerns in the interim.          Thank you for allowing me to participate in the care of this patient.  If you have any questions please don't hesitate to contact me.    I spent 30 minutes on this date of service performing the following activities: obtaining history, performing examination, entering orders, documenting, preparing for visit, obtaining / reviewing records, providing counseling and education and communicating results.     Nestor Can MD [facility]   4/29/2025  4:48 PM

## 2025-04-28 NOTE — PATIENT INSTRUCTIONS
Patient Education   Mediterranean Diet  A Mediterranean diet refers to food and lifestyle choices that are based on the traditions of countries located on the [place]. It focuses on eating more fruits, vegetables, whole grains, beans, nuts, seeds, and heart-healthy fats, and eating less dairy, meat, eggs, and processed foods with added sugar, salt, and fat. This way of eating has been shown to help prevent certain conditions and improve outcomes for people who have chronic diseases, like kidney disease and heart disease.  What are tips for following this plan?  Reading food labels  Check the serving size of packaged foods. For foods such as rice and pasta, the serving size refers to the amount of cooked product, not dry.  Check the total fat in packaged foods. Avoid foods that have saturated fat or trans fats.  Check the ingredient list for added sugars, such as corn syrup.  Shopping    Buy a variety of foods that offer a balanced diet, including:  Fresh fruits and vegetables (produce).  Grains, beans, nuts, and seeds. Some of these may be available in unpackaged forms or large amounts (in bulk).  Fresh seafood.  Poultry and eggs.  Low-fat dairy products.  Buy whole ingredients instead of prepackaged foods.  Buy fresh fruits and vegetables in-season from local [place] [employer] information [employer] markets.  Buy plain frozen fruits and vegetables.  If you do not have access to quality fresh seafood, buy precooked frozen shrimp or canned fish, such as tuna, salmon, or sardines.  Stock your pantry so you always have certain foods on hand, such as olive oil, canned tuna, canned tomatoes, rice, pasta, and beans.  Cooking  Cook foods with extra-virgin olive oil instead of using butter or other vegetable oils.  Have meat as a side dish, and have vegetables or grains as your main dish. This means having meat in small portions or adding small amounts of meat to foods like pasta or stew.  Use beans or vegetables instead of meat in common dishes  like chili or lasagna.  [place] with different cooking methods. Try roasting, broiling, steaming, and sautéing vegetables.  Add frozen vegetables to soups, stews, pasta, or rice.  Add nuts or seeds for added healthy fats and plant protein at each meal. You can add these to yogurt, salads, or vegetable dishes.  Marinate fish or vegetables using olive oil, lemon juice, garlic, and fresh herbs.  Meal planning  Plan to eat one vegetarian meal one day each week. Try to work up to two vegetarian meals, if possible.  Eat seafood two or more times a week.  Have healthy snacks readily available, such as:  Vegetable sticks with hummus.  Greek yogurt.  Fruit and nut trail mix.  Eat balanced meals throughout the week. This includes:  Fruit: 2-3 servings a day.  Vegetables: 4-5 servings a day.  Low-fat dairy: 2 servings a day.  Fish, poultry, or lean meat: 1 serving a day.  Beans and legumes: 2 or more servings a week.  Nuts and seeds: 1-2 servings a day.  Whole grains: 6-8 servings a day.  Extra-virgin olive oil: 3-4 servings a day.  Limit red meat and sweets to only a few servings a month.  Lifestyle    Cook and eat meals together with your family, when possible.  Drink enough fluid to keep your urine pale yellow.  Be physically active every day. This includes:  Aerobic exercise like running or swimming.  Leisure activities like gardening, walking, or housework.  Get 7-8 hours of sleep each night.  If recommended by your health care provider, drink red wine in moderation. This means 1 glass a day for nonpregnant women and 2 glasses a day for men. A glass of wine equals 5 oz (150 mL).  What foods should I eat?  Fruits  Apples. Apricots. Avocado. Berries. Bananas. Cherries. Dates. Figs. Grapes. Polly. Melon. Oranges. Peaches. Plums. Pomegranate.  Vegetables  Artichokes. Beets. Broccoli. Cabbage. Carrots. Eggplant. Green beans. Chard. Kale. Spinach. Onions. Leeks. Peas. Squash. Tomatoes. Peppers.  Radishes.  Grains  Whole-grain pasta. Brown rice. Bulgur wheat. Polenta. Couscous. Whole-wheat bread. Oatmeal. Quinoa.  Meats and other proteins  Beans. Almonds. Sunflower seeds. Pine nuts. Peanuts. Cod. [place]. Scallops. Shrimp. Tuna. Tilapia. Clams. Oysters. Eggs. Poultry without skin.  Dairy  Low-fat milk. Cheese. Greek yogurt.  Fats and oils  Extra-virgin olive oil. Avocado oil. Grapeseed oil.  Beverages  Water. Red wine. Herbal tea.  Sweets and desserts  Greek yogurt with honey. Baked apples. Poached pears. Trail mix.  Seasonings and condiments  Basil. Cilantro. Coriander. Cumin. Mint. Parsley. Andrey. Rosemary. Tarragon. Garlic. Oregano. Thyme. Pepper. Balsamic vinegar. Tahini. Hummus. Tomato sauce. Olives. Mushrooms.  The items listed above may not be a complete list of foods and beverages you can eat. Contact a dietitian for more information.  What foods should I limit?  This is a list of foods that should be eaten rarely or only on special occasions.  Fruits  Fruit canned in syrup.  Vegetables  Deep-fried potatoes (french fries).  Grains  Prepackaged pasta or rice dishes. Prepackaged cereal with added sugar. Prepackaged snacks with added sugar.  Meats and other proteins  Beef. Pork. Lamb. Poultry with skin. Hot dogs. Crump.  Dairy  Ice cream. Sour cream. Whole milk.  Fats and oils  Butter. Canola oil. Vegetable oil. Beef fat (tallow). Lard.  Beverages  Juice. Sugar-sweetened soft drinks. Beer. Liquor and spirits.  Sweets and desserts  Cookies. Cakes. Pies. Candy.  Seasonings and condiments  Mayonnaise. Pre-made sauces and marinades.  The items listed above may not be a complete list of foods and beverages you should limit. Contact a dietitian for more information.  Summary  The Mediterranean diet includes both food and lifestyle choices.  Eat a variety of fresh fruits and vegetables, beans, nuts, seeds, and whole grains.  Limit the amount of red meat and sweets that you eat.  If recommended by your health  care provider, drink red wine in moderation. This means 1 glass a day for nonpregnant women and 2 glasses a day for men. A glass of wine equals 5 oz (150 mL).  This information is not intended to replace advice given to you by your health care provider. Make sure you discuss any questions you have with your health care provider.  Document Revised: 01/22/2021 Document Reviewed: 11/19/2020  Elsevier Patient Education © 2023 [employer]

## 2025-04-29 ENCOUNTER — OFFICE VISIT (OUTPATIENT)
Dept: CARDIOLOGY | Facility: CLINIC | Age: 65
End: 2025-04-29
Payer: COMMERCIAL

## 2025-04-29 ENCOUNTER — OFFICE VISIT (OUTPATIENT)
Dept: FAMILY MEDICINE | Facility: CLINIC | Age: 65
End: 2025-04-29
Payer: COMMERCIAL

## 2025-04-29 VITALS
OXYGEN SATURATION: 99 % | HEIGHT: 70 IN | TEMPERATURE: 97.3 F | SYSTOLIC BLOOD PRESSURE: 164 MMHG | HEART RATE: 72 BPM | WEIGHT: 197.2 LBS | BODY MASS INDEX: 28.23 KG/M2 | DIASTOLIC BLOOD PRESSURE: 78 MMHG

## 2025-04-29 VITALS
DIASTOLIC BLOOD PRESSURE: 90 MMHG | WEIGHT: 196 LBS | HEIGHT: 70 IN | OXYGEN SATURATION: 98 % | SYSTOLIC BLOOD PRESSURE: 160 MMHG | HEART RATE: 65 BPM | BODY MASS INDEX: 28.06 KG/M2

## 2025-04-29 DIAGNOSIS — I10 ESSENTIAL HYPERTENSION, BENIGN: ICD-10-CM

## 2025-04-29 DIAGNOSIS — Z23 NEED FOR PNEUMOCOCCAL VACCINE: ICD-10-CM

## 2025-04-29 DIAGNOSIS — F10.90 ALCOHOL USE DISORDER: ICD-10-CM

## 2025-04-29 DIAGNOSIS — E78.2 MIXED HYPERLIPIDEMIA: ICD-10-CM

## 2025-04-29 DIAGNOSIS — I25.10 CORONARY ARTERY DISEASE INVOLVING NATIVE CORONARY ARTERY OF NATIVE HEART WITHOUT ANGINA PECTORIS: Primary | ICD-10-CM

## 2025-04-29 DIAGNOSIS — I25.10 CORONARY ARTERY DISEASE INVOLVING NATIVE CORONARY ARTERY OF NATIVE HEART WITHOUT ANGINA PECTORIS: ICD-10-CM

## 2025-04-29 DIAGNOSIS — I77.810 ASCENDING AORTA DILATATION (CMS/HCC): ICD-10-CM

## 2025-04-29 DIAGNOSIS — R93.1 AGATSTON CORONARY ARTERY CALCIUM SCORE GREATER THAN 400: Primary | ICD-10-CM

## 2025-04-29 DIAGNOSIS — F41.1 GENERALIZED ANXIETY DISORDER: ICD-10-CM

## 2025-04-29 LAB
ATRIAL RATE: 57
P AXIS: 42
PR INTERVAL: 150
QRS DURATION: 90
QT INTERVAL: 420
QTC CALCULATION(BAZETT): 408
R AXIS: -28
T WAVE AXIS: 35
VENTRICULAR RATE: 57

## 2025-04-29 PROCEDURE — 93000 ELECTROCARDIOGRAM COMPLETE: CPT | Performed by: INTERNAL MEDICINE

## 2025-04-29 PROCEDURE — 3077F SYST BP >= 140 MM HG: CPT | Performed by: FAMILY MEDICINE

## 2025-04-29 PROCEDURE — 99214 OFFICE O/P EST MOD 30 MIN: CPT | Performed by: INTERNAL MEDICINE

## 2025-04-29 PROCEDURE — 90471 IMMUNIZATION ADMIN: CPT | Performed by: FAMILY MEDICINE

## 2025-04-29 PROCEDURE — 90677 PCV20 VACCINE IM: CPT | Performed by: FAMILY MEDICINE

## 2025-04-29 PROCEDURE — 3008F BODY MASS INDEX DOCD: CPT | Performed by: FAMILY MEDICINE

## 2025-04-29 PROCEDURE — 3008F BODY MASS INDEX DOCD: CPT | Performed by: INTERNAL MEDICINE

## 2025-04-29 PROCEDURE — 3078F DIAST BP <80 MM HG: CPT | Performed by: FAMILY MEDICINE

## 2025-04-29 PROCEDURE — 3080F DIAST BP >= 90 MM HG: CPT | Performed by: INTERNAL MEDICINE

## 2025-04-29 PROCEDURE — 3077F SYST BP >= 140 MM HG: CPT | Performed by: INTERNAL MEDICINE

## 2025-04-29 PROCEDURE — 99214 OFFICE O/P EST MOD 30 MIN: CPT | Mod: 25 | Performed by: FAMILY MEDICINE

## 2025-04-29 RX ORDER — VALSARTAN 80 MG/1
80 TABLET ORAL DAILY
Qty: 90 TABLET | Refills: 3 | Status: SHIPPED | OUTPATIENT
Start: 2025-04-29

## 2025-04-29 RX ORDER — ROSUVASTATIN CALCIUM 20 MG/1
20 TABLET, COATED ORAL DAILY
Qty: 90 TABLET | Refills: 3 | Status: SHIPPED | OUTPATIENT
Start: 2025-04-29 | End: 2025-05-12

## 2025-04-29 ASSESSMENT — ENCOUNTER SYMPTOMS
IRREGULAR HEARTBEAT: 0
PALPITATIONS: 0
LIGHT-HEADEDNESS: 0
NEAR-SYNCOPE: 0
DIZZINESS: 0
SHORTNESS OF BREATH: 0
DIZZINESS: 0
SYNCOPE: 0
DYSPNEA ON EXERTION: 0
COUGH: 0
ORTHOPNEA: 0
PND: 0
LIGHT-HEADEDNESS: 0
FEVER: 0
PALPITATIONS: 0
HEADACHES: 0
CHILLS: 0

## 2025-04-29 NOTE — ASSESSMENT & PLAN NOTE
LDL well below goal.   Continue statin.  If he loses more weight, we could consider stopping zetia and monitoring LDL.

## 2025-04-29 NOTE — LETTER
April 29, 2025     Deepa Vega MD  [address] [facility] [address]  [facility] [address]    Patient: Jaskaran Simpson  YOB: 1960  Date of Visit: 4/29/2025      Dear Dr. Vega:    Thank you for referring Jaskaran Simpson to me for evaluation. Below are my notes for this consultation.    If you have questions, please do not hesitate to call me. I look forward to following your patient along with you.         Sincerely,        Nestor Can MD        CC: No Recipients    Nestor Can MD  4/29/2025  4:49 PM  Sign when Signing Visit     Cardiology Note       Reason for visit: Coronary artery disease.      Jaskaran returns today for follow-up.  He tells me he has been doing well without any chest discomfort or shortness of breath.  There has not been any dizziness, near-syncope or syncope.  He does not like taking the medications.  He continues to be active and has not had any effort related symptoms.    There has not been any recent hospitalizations or emergency room visits.    On exam in the office, his blood pressure was 160/90 and had been elevated when he saw you in the office.    EKG revealed sinus bradycardia but was otherwise normal.    Jaskaran had a CT coronary angiogram 1 year ago which revealed severe coronary artery calcification with total CAC score of 1978.  There was multivessel coronary disease but it was nonobstructive.    Clinically, he has not had any anginal symptoms.        Past Medical History:   Diagnosis Date    Agatston coronary artery calcium score greater than 400 2/26/2024    Ascending aorta dilatation (CMS/HCC) 2/26/2024    Hypertension     Mixed hyperlipidemia 12/29/2009     Past Surgical History   Procedure Laterality Date    Inguinal hernia repair      Knee surgery       Social History     Tobacco Use    Smoking status: Former     Current packs/day: 0.00     Average packs/day: 0.5 packs/day for 20.0 years (10.0 ttl pk-yrs)     Types: Cigarettes      Start date: 2004     Quit date: 2024     Years since quittin.1    Smokeless tobacco: Never   Vaping Use    Vaping status: Never Used   Substance Use Topics    Alcohol use: Yes     Alcohol/week: 8.0 standard drinks of alcohol     Types: 8 Standard drinks or equivalent per week    Drug use: Not Currently      Family History   Problem Relation Name Age of Onset    Heart attack Biological Mother          passed away when she was 77    Hypertension Biological Mother      Hyperlipidemia Biological Mother      Colon cancer Biological Father      Heart attack Biological Father      Prostate cancer Neg Hx       Patient has no known allergies.  Current Outpatient Medications   Medication Instructions    aspirin 81 mg, Daily    rosuvastatin (CRESTOR) 20 mg, oral, Daily    valsartan (DIOVAN) 80 mg, oral, Daily          Review of Systems   Cardiovascular:  Negative for chest pain, dyspnea on exertion, irregular heartbeat, leg swelling, near-syncope, orthopnea, palpitations, paroxysmal nocturnal dyspnea and syncope.   Neurological:  Negative for dizziness and light-headedness.      Objective    Vitals:    25 1533   BP: (!) 160/90   Pulse: 65   SpO2: 98%      Wt Readings from Last 3 Encounters:   25 88.9 kg (196 lb)   25 89.4 kg (197 lb 3.2 oz)   24 87.2 kg (192 lb 3.2 oz)      Physical Exam  Neck:      Vascular: No carotid bruit or JVD.   Cardiovascular:      Rate and Rhythm: Normal rate and regular rhythm.      Pulses:           Carotid pulses are 2+ on the right side and 2+ on the left side.       Dorsalis pedis pulses are 2+ on the right side and 2+ on the left side.        Posterior tibial pulses are 2+ on the right side and 2+ on the left side.      Heart sounds: S1 normal and S2 normal. No murmur heard.     No friction rub. No gallop.   Pulmonary:      Effort: Pulmonary effort is normal.      Breath sounds: Normal breath sounds.   Musculoskeletal:      Right lower leg: No  edema.      Left lower leg: No edema.   Skin:     General: Skin is warm and dry.   Neurological:      Mental Status: He is alert.   Psychiatric:         Behavior: Behavior normal.                                 CT coronary angiogram 4/16/2024.  CORONARY ARTERIES     LEFT MAIN: Arises from the left coronary cusp and bifurcates into a left  anterior descending and left circumflex arteries.  Calcified plaque results in  minimal (<25%) stenosis.     LAD: Arises from the left main coronary artery and gives rise to three diagonal  branches before terminating at the left ventricular apex.  There is calcified  plaque in the proximal to mid vessel around the area of the first diagonal which  results in moderate (70%) stenosis that is borderline hemodynamically  significant with a drop of CT FFR value of 0.95 to 0.83 after the lesion.  The  remainder of the vessel slowly tapers with a slowly decreasing CT FFR down to  0.69 at the left ventricular apex.  Diagonal branches are small in size and  appear patent.     LCX: Arises from the left main coronary artery and immediately bifurcates into  the first obtuse marginal before coursing inferolaterally in the left  atrioventricular groove and giving rise to a second obtuse marginal branch  before terminating as a diminutive vessel.  There is calcified plaque at the  vessel ostium and the mid vessel that results in minimal (<25%) stenosis.  There  is calcified plaque in the first obtuse marginal that results in minimal (<25%)  stenosis.  There is calcified plaque in the second obtuse marginal that results  in minimal (<25%) stenosis.     RCA: Arises from the right coronary cusp and courses in the right  atrioventricular groove before wrapping around the inferior aspect of the heart  and giving rise to right posterior descending artery and right posterior lateral  branch.  There is calcified plaque in the proximal vessel that results in  minimal (<25%) stenosis.  There is  noncalcified plaque and mixed etiology plaque  in the distal vessel which results in mild (<50 %) stenosis. The posterior  descending artery and posterior lateral branches appear patent.     CORONARY CALCIUM COMPOSITE AGATSTON SCORE: 1978  LM: 518    LAD: 637    LCX: 224    RCA: 599  This places the patient in the SORIA 98th risk percentile for age and gender  matched individuals.     SEGMENT INVOLVEMENT SCORE:  TOTAL: 8    LM: 1    LAD: 2    LCX: 3    RCA: 2     CARDIAC MORPHOLOGY     LEFT VENTRICLE: Normal in appearance.  LEFT ATRIUM: Mildly dilated.  LEFT ATRIAL APPENDAGE: Normal in appearance.  RIGHT VENTRICLE: Normal in appearance.  RIGHT ATRIUM: Normal.  AORTIC VALVE: Appears tricuspid.  MITRAL VALVE: Normal in appearance.  TRICUSPID VALVE: Not well visualized.  PULMONIC VALVE: Not well visualized.     PERICARDIUM: Normal in appearance.     AORTA     AORTIC ROOT: Mild calcification.  SINUS OF VALSALVA: Maximal diameter 39 mm.  ASCENDING THORACIC AORTA: Maximal diameter 41 mm.     SUMMARY:  1.  Multivessel, borderline obstructive coronary artery disease. There is  calcified plaque in the proximal to mid vessel around the area of the first  diagonal which results in moderate (50-70%) that is borderline hemodynamically  significant with a drop of CT FFR value of 0.95 to 0.83 after the lesion.  The  remainder of the vessel slowly tapers with a slowly decreasing CT FFR down to  0.69 at the left ventricular apex.  The remainder of the epicardial vessels  exhibit mild (<50%) stenosis.  2.  Normal cardiac morphology, imaged in diastole.  3.  Normal aortic root size with a maximal diameter of 39 mm at the sinuses of  Valsalva.  Mildly dilated ascending thoracic aorta measuring 41 mm.          Exercise nuclear stress test.  3/12/2024.  This is an abnormal exercise nuclear stress test.  Below average exercise capacity.  Exercise EKG negative for ischemia or arrhythmia.  Perfusion images demonstrate a moderate to large  area of moderately reduced isotope uptake in the basal and mid inferior, basal inferoseptal and basal inferolateral segments.  At rest, the basal and mid inferior defect is fixed, but there is improvement in the basal inferoseptal and basal inferolateral segments.  Although there is significant motion and adjacent GI uptake on resting images, findings are concerning for a small amount of ischemia in the territory of the right coronary artery.  LVEF 59% with grossly normal wall motion.       Transthoracic echocardiogram.  3/12/2024.    Left Ventricle: Normal ventricle size. Normal wall thickness. Estimated EF 65-70%. No regional wall motion abnormalities. Normal diastolic filling pattern for age.    Right Ventricle: Normal ventricle size. Normal systolic function.    Left Atrium: Normal sized atrium.    Mitral Valve: Normal leaflet structure. Normal leaflet motion. Trace regurgitation. The jet is centrally directed. No stenosis.    Right Atrium: Normal sized atrium.    Aortic Valve: Tricuspid valve.  Sclerotic leaflets. No regurgitation. No stenosis.    Tricuspid Valve: Normal structure. Mild regurgitation. The regurgitation jet is central. Estimated RVSP = 25 mmHg.    Aorta:  Mild dilatation at the sinuses of Valsalva. Mild dilatation of the aortic root.  4 cm.   Mild dilatation of the ascending aorta.  4.3 cm.    No prior echocardiogram for comparison.    ECG.  Sinus bradycardia otherwise normal EKG.     Assessment/Plan    Mixed hyperlipidemia  Jaskaran had a lipid profile performed the other day which revealed a total cholesterol of 110, triglycerides 63, HDL 66 and LDL was 30.  His LDL goal is less than 70 but he is concerned about being so low and previously his LDL was 74.    I told him to remain on the rosuvastatin and to take it on a regular basis.  He can stop this ezetimibe and we will repeat his blood work in 6 months.    I counseled him on the continued need for diet and exercise.    His LDL in  January 2024 was 179 and it has come down considerably.    I told him that if his LDL was not below 70 at his next evaluation then the plan will be for him to go back on the ezetimibe.    Essential hypertension, benign  Blood pressure was elevated today in the office and also 1 he had seen you.  He cannot tell when his blood pressure is elevated.    I have increased his valsartan to 80 mg daily and he will monitor his blood pressure at home.  I reviewed with him the potential side effects of the higher dosage.    Ascending aorta dilatation (CMS/HCC)  Mild dilatation of the ascending aorta measuring 4 cm.  I will continue to follow him clinically and with serial imaging studies.    Agatston coronary artery calcium score greater than 400  CAC score 1978.  He will continue on the aspirin and statin therapy.  LDL goal is less than 70.    Jaskaran had an exercise nuclear stress test last year which was abnormal and prompted him to have CT coronary angiography which showed no significant obstruction there was a borderline stenosis in the first diagonal.    He has been asymptomatic.  He will continue on the aspirin and statin therapy.      Jaskaran will be coming due for his day-to-day care.  He will return to see me in 6 months time or sooner if there is a problem.  I will continue to keep you informed of his progress.  He will call with any questions or concerns in the interim.          Thank you for allowing me to participate in the care of this patient.  If you have any questions please don't hesitate to contact me.    I spent 30 minutes on this date of service performing the following activities: obtaining history, performing examination, entering orders, documenting, preparing for visit, obtaining / reviewing records, providing counseling and education and communicating results.     Nestor Can MD [facility]   4/29/2025  4:48 PM

## 2025-04-29 NOTE — ASSESSMENT & PLAN NOTE
Jaskaran had a lipid profile performed the other day which revealed a total cholesterol of 110, triglycerides 63, HDL 66 and LDL was 30.  His LDL goal is less than 70 but he is concerned about being so low and previously his LDL was 74.    I told him to remain on the rosuvastatin and to take it on a regular basis.  He can stop this ezetimibe and we will repeat his blood work in 6 months.    I counseled him on the continued need for diet and exercise.    His LDL in January 2024 was 179 and it has come down considerably.    I told him that if his LDL was not below 70 at his next evaluation then the plan will be for him to go back on the ezetimibe.

## 2025-04-29 NOTE — ASSESSMENT & PLAN NOTE
He did go through rehab.  He is drinking again, though less than previously  Self medicating anxiety.  Strongly encouraged him to consider sertraline for anxiety.

## 2025-04-29 NOTE — PROGRESS NOTES
"Subjective    Jaskaran Simpson is a 64 y.o. male presenting today for: Follow-up      HPI: 65yo male with h/o CAD, HTN, HL, former smoker here for follow-up of multiple medical problems as listed below.    Jaskaran is generally feeling well. He has been trying to cut down on alcohol.  Drinking twice a week, \"a lot.\" A few shots and 16oz high alcohol beer.   He did go to [facility] for a while. Stopped a month ago.    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 Physically active at work and denies exertional symptoms.    Diet has been good lately. Eating more fish. Limiting saturated fat and processed carbs.      He takes his statin, zetia, and ARB regularly.  He has been inconsistent with asa.       Health Maintenance Due   Topic Date Due    HIV Screening  Never done    Pneumococcal (50 years of age and older) (1 of 1 - PCV) Never done    COVID-19 Vaccine (4 - 2024-25 season) 09/01/2024     --    Patient Active Problem List   Diagnosis    Essential hypertension, benign    Family hx of colon cancer    Mixed hyperlipidemia    Cervical radiculitis    Routine health maintenance    Agatston coronary artery calcium score greater than 400    Ascending aorta dilatation (CMS/HCC)    Coronary artery disease involving native coronary artery of native heart without angina pectoris    Alcohol use disorder    Generalized anxiety disorder          Current Outpatient Medications:     aspirin 81 mg enteric coated tablet, Take 81 mg by mouth daily., Disp: , Rfl:     ezetimibe (ZETIA) 10 mg tablet, Take 1 tablet (10 mg total) by mouth nightly., Disp: 90 tablet, Rfl: 3    rosuvastatin (CRESTOR) 20 mg tablet, Take 1 tablet (20 mg total) by mouth daily., Disp: 90 tablet, Rfl: 3    valsartan (DIOVAN) 40 mg tablet, Take 1 tablet (40 mg total) by mouth daily., Disp: 90 tablet, Rfl: 3     No Known Allergies        Review of Systems   Constitutional:  Negative for chills and fever.   Respiratory:  Negative for cough and " "shortness of breath.    Cardiovascular:  Negative for chest pain and palpitations.   Neurological:  Negative for dizziness, light-headedness and headaches.          Objective  Visit Vitals  BP (!) 164/78 (BP Location: Left upper arm, Patient Position: Sitting)   Pulse 72   Temp 36.3 °C (97.3 °F) (Temporal)   Ht 1.778 m (5' 10\")   Wt 89.4 kg (197 lb 3.2 oz)   SpO2 99%   BMI 28.30 kg/m²    Body mass index is 28.3 kg/m².    Wt Readings from Last 3 Encounters:   04/29/25 89.4 kg (197 lb 3.2 oz)   11/22/24 87.2 kg (192 lb 3.2 oz)   10/29/24 91.6 kg (202 lb)       BP Readings from Last 3 Encounters:   04/29/25 (!) 164/78   11/22/24 (!) 180/80   10/29/24 (!) 150/90            Physical Exam  Vitals reviewed.   Constitutional:       Appearance: Normal appearance. He is not ill-appearing.   HENT:      Head: Normocephalic and atraumatic.      Right Ear: External ear normal.      Left Ear: External ear normal.   Eyes:      Conjunctiva/sclera: Conjunctivae normal.      Pupils: Pupils are equal, round, and reactive to light.   Cardiovascular:      Rate and Rhythm: Normal rate and regular rhythm.      Heart sounds: No murmur heard.  Pulmonary:      Effort: Pulmonary effort is normal.      Breath sounds: Normal breath sounds. No wheezing, rhonchi or rales.   Skin:     General: Skin is warm and dry.   Neurological:      General: No focal deficit present.      Mental Status: He is alert and oriented to person, place, and time.   Psychiatric:         Mood and Affect: Mood normal.         Behavior: Behavior normal.              Laboratories    Collected Updated Procedure    04/25/2025 1253 04/26/2025 1806 Lipid panel [[id number]]    Blood, Venous    Component Value Units   Cholesterol, Total 110 mg/dL   Triglycerides 63 mg/dL   HDL Cholesterol 66 mg/dL   VLDL Cholesterol [place] 14 mg/dL   LDL Cholesterol Calc 30 mg/dL          04/25/2025 1250 04/26/2025 1805 PSA [[id number]]    Blood, Venous    Component Value Units   Prostate Specific Ag, " Serum 1.9  ng/mL          04/25/2025 1250 04/26/2025 0606 Hemoglobin A1c [[id number]]    (Abnormal)   Blood, Venous    Component Value Units   Hemoglobin A1c 5.8 High   %          04/25/2025 1250 04/26/2025 1205 Comprehensive metabolic panel [[id number]]    (Abnormal)   Blood, Venous    Component Value Units   Glucose, Serum 84 mg/dL   BUN 15 mg/dL   Creatinine, Serum 1.02 mg/dL   eGFR 83 mL/min/1.73   BUN/Creatinine Ratio 15    Sodium, Serum 138 mmol/L   Potassium, Serum 4.4 mmol/L   Chloride, Serum 100 mmol/L   Carbon Dioxide, Total 17 Low  mmol/L   Calcium, Serum 9.4 mg/dL   Protein, Total, Serum 7.0 g/dL   Albumin, Serum 4.8 g/dL   Globulin, Total 2.2 g/dL   Bilirubin, Total 1.0 mg/dL   Alkaline Phosphatase, S 61 IU/L   AST (SGOT) 19 IU/L   ALT (SGPT) 24 IU/L             Assessment/Plan  Assessment & Plan  Coronary artery disease involving native coronary artery of native heart without angina pectoris  No anginal sx.  LDL well below goal.   Needs to be more consistent with aspirin.  Continue statin.  If he loses more weight, we could consider stopping zetia and monitoring LDL.         Essential hypertension, benign  Elevated here.  I asked him to restart monitoring and send me recordings.        Alcohol use disorder  He did go through rehab.  He is drinking again, though less than previously  Self medicating anxiety.  Strongly encouraged him to consider sertraline for anxiety.         Generalized anxiety disorder  Discussed potential of SSRI, so that he ideally wouldn't have to self-medicate with alcohol.  He will think about it. I strongly encouraged him.            Mixed hyperlipidemia  LDL well below goal.   Continue statin.  If he loses more weight, we could consider stopping zetia and monitoring LDL.         Need for pneumococcal vaccine    Orders:    Pneumococcal conjugate vaccine 20-valent IM             Return in about 6 months (around 10/29/2025).     Deepa Vega MD

## 2025-04-29 NOTE — ASSESSMENT & PLAN NOTE
CAC score 1978.  He will continue on the aspirin and statin therapy.  LDL goal is less than 70.    Jaskaran had an exercise nuclear stress test last year which was abnormal and prompted him to have CT coronary angiography which showed no significant obstruction there was a borderline stenosis in the first diagonal.    He has been asymptomatic.  He will continue on the aspirin and statin therapy.

## 2025-04-29 NOTE — ASSESSMENT & PLAN NOTE
Discussed potential of SSRI, so that he ideally wouldn't have to self-medicate with alcohol.  He will think about it. I strongly encouraged him.

## 2025-04-29 NOTE — ASSESSMENT & PLAN NOTE
No anginal sx.  LDL well below goal.   Needs to be more consistent with aspirin.  Continue statin.  If he loses more weight, we could consider stopping zetia and monitoring LDL.

## 2025-04-29 NOTE — ASSESSMENT & PLAN NOTE
Blood pressure was elevated today in the office and also 1 he had seen you.  He cannot tell when his blood pressure is elevated.    I have increased his valsartan to 80 mg daily and he will monitor his blood pressure at home.  I reviewed with him the potential side effects of the higher dosage.

## 2025-04-29 NOTE — ASSESSMENT & PLAN NOTE
Mild dilatation of the ascending aorta measuring 4 cm.  I will continue to follow him clinically and with serial imaging studies.

## 2025-05-05 DIAGNOSIS — F41.1 GENERALIZED ANXIETY DISORDER: Primary | ICD-10-CM

## 2025-05-05 RX ORDER — SERTRALINE HYDROCHLORIDE 50 MG/1
TABLET, FILM COATED ORAL
Qty: 30 TABLET | Refills: 2 | Status: SHIPPED | OUTPATIENT
Start: 2025-05-05 | End: 2025-06-11

## 2025-05-11 DIAGNOSIS — I25.10 CORONARY ARTERY DISEASE INVOLVING NATIVE CORONARY ARTERY OF NATIVE HEART WITHOUT ANGINA PECTORIS: ICD-10-CM

## 2025-05-12 RX ORDER — ROSUVASTATIN CALCIUM 20 MG/1
20 TABLET, COATED ORAL DAILY
Qty: 90 TABLET | Refills: 3 | Status: SHIPPED | OUTPATIENT
Start: 2025-05-12

## 2025-08-18 ENCOUNTER — OFFICE VISIT (OUTPATIENT)
Dept: FAMILY MEDICINE | Facility: CLINIC | Age: 65
End: 2025-08-18
Payer: COMMERCIAL

## 2025-08-18 VITALS
HEIGHT: 70 IN | HEART RATE: 87 BPM | TEMPERATURE: 97.4 F | WEIGHT: 202.6 LBS | OXYGEN SATURATION: 98 % | BODY MASS INDEX: 29.01 KG/M2 | SYSTOLIC BLOOD PRESSURE: 160 MMHG | DIASTOLIC BLOOD PRESSURE: 92 MMHG

## 2025-08-18 DIAGNOSIS — R05.1 ACUTE COUGH: Primary | ICD-10-CM

## 2025-08-18 PROCEDURE — 99213 OFFICE O/P EST LOW 20 MIN: CPT | Performed by: FAMILY MEDICINE

## 2025-08-18 PROCEDURE — 3077F SYST BP >= 140 MM HG: CPT | Performed by: FAMILY MEDICINE

## 2025-08-18 PROCEDURE — 3008F BODY MASS INDEX DOCD: CPT | Performed by: FAMILY MEDICINE

## 2025-08-18 PROCEDURE — 3080F DIAST BP >= 90 MM HG: CPT | Performed by: FAMILY MEDICINE

## 2025-08-18 RX ORDER — AZITHROMYCIN 250 MG/1
TABLET, FILM COATED ORAL
Qty: 6 TABLET | Refills: 0 | Status: SHIPPED | OUTPATIENT
Start: 2025-08-18

## 2025-08-18 RX ORDER — METHYLPREDNISOLONE 4 MG/1
TABLET ORAL
Qty: 21 TABLET | Refills: 0 | Status: SHIPPED | OUTPATIENT
Start: 2025-08-18 | End: 2025-08-25